# Patient Record
Sex: FEMALE | Employment: FULL TIME | URBAN - METROPOLITAN AREA
[De-identification: names, ages, dates, MRNs, and addresses within clinical notes are randomized per-mention and may not be internally consistent; named-entity substitution may affect disease eponyms.]

---

## 2017-01-20 ENCOUNTER — OFFICE VISIT (OUTPATIENT)
Dept: DERMATOLOGY | Facility: CLINIC | Age: 32
End: 2017-01-20

## 2017-01-20 DIAGNOSIS — D23.9 DERMATOFIBROMA: Primary | ICD-10-CM

## 2017-01-20 DIAGNOSIS — L81.4 SOLAR LENTIGO: ICD-10-CM

## 2017-01-20 DIAGNOSIS — L81.1 MELASMA: ICD-10-CM

## 2017-01-20 DIAGNOSIS — L70.0 ACNE VULGARIS: ICD-10-CM

## 2017-01-20 RX ORDER — CITALOPRAM HYDROBROMIDE 10 MG/1
TABLET ORAL
COMMUNITY
Start: 2016-12-26

## 2017-01-20 ASSESSMENT — PAIN SCALES - GENERAL: PAINLEVEL: NO PAIN (0)

## 2017-01-20 NOTE — PROGRESS NOTES
"[facility] Dermatology Note      Dermatology Problem List:  1. Melasma  -4% hydroquinone and 2.5% hydrocortisone QHS for three months then stop  -Sun protective measures  -Reassess at next visit if she needs to continue treatment  2. Acne  -Tretinoin 0.1% QHS  3. Solar lentigenes  -Sun protection  4. DF  -Reassured pt about benign nature  5. Irritated nevus, right breast  -Pt is considering having this removed; assess at next visit    Encounter Date: Jan 20, 2017    CC:    Chief Complaint    Patient presents with       Derm Problem        3 month follow up for Melasma. \"the upper lip has gotten better but not a huge change on the left cheek. It has helped my acne alot.\" The patient has a lesion on right hip area, she had something like this removed about 10 years ago.         History of Present Illness:  Ms. Velvet Espinoza is a 31 year old female who presents as a follow-up for melsma.     The pt states the spots have been present for about 3-4 years. She has tried several OTC agents, including [place] (2% hydroquinone) which did not help. She did see an outside dermatologist about 3-4 years ago when this started, but that physician did not tell her what this was. Of note, pt was pregnant two years ago, and she is unsure if her melasma worsened during that time, but since then, she has developed ner dark lesions on her face. She does remark that it tends to be more noticeable when she is outside for prolonged periods of time. Pt does admits to a hx of irregular menses, but when she has been on OCP in past, she does not think it has affected her dark spots. Otherwise, the pt denies any other skin lesions at this time.    Past Medical History:    There is no problem list on file for this patient.     Past Medical History     History reviewed. No pertinent past medical history.      Past Surgical History     History reviewed. No pertinent past surgical history.       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    Family History:  There is no family history of skin cancer.    Medications:   Current Outpatient Prescriptions     Current Outpatient Prescriptions    Medication  Sig  Dispense  Refill       buPROPion (WELLBUTRIN SR) 150 MG 12 hr tablet  daily                           No Known Allergies      Review of Systems:  -As per HPI  -Constitutional: The patient denies fatigue, fevers, chills, unintended weight loss, and night sweats.  -HEENT: Patient denies nonhealing oral sores.  -Skin: As above in HPI. No additional skin concerns.    Physical exam:  Vitals: There were no vitals taken for this visit.  GEN: This is a well developed, well-nourished female in no acute distress, in a pleasant mood.     SKIN: Focused examination of the face, scalp, neck, right breast, and lower legs was performed.  -Hyperpigmented macules and patches that are excentuated with the Wood's lamp are noted on the forehead, temples, and malar cheeks  -Hyperpigmented macules on the temples that are not excenuated with the Wood's lamp  -A hyperpimgneted papule with a positive Desir sign on the left lower leg  -A papule with a true pigment network on the right breast  -No other lesions of concern on areas examined.     Impression/Plan:  1. Melasma  -4% hydroquinone and 2.5% hydrocortisone QHS for three months then stop  -Sun protective measures discussed  -At next visit, will discuss if she should continue treatment or stop    2. Acne  -Tretinoin 0.1% QHS    3. Solar lentigenes  -Sun protection discussed    4. DF  -Left lower leg  -Reassured pt about benign nature    5. Irritated nevus, right breast  -Pt is considering having this removed; assess at next visit    Follow-up in 3 months, earlier for new or changing lesions.     Dr. Leach staffed the patient.    Staff Involved:  Resident(Aneesh Hensley)/Staff(as above)

## 2017-01-20 NOTE — NURSING NOTE
"Dermatology Rooming Note    Velvet Espinoza's goals for this visit include:   Chief Complaint   Patient presents with     Derm Problem     3 month follow up for Melasma. \"the upper lip has gotten better but not a huge change on the left cheek. It has helped my acne alot.\" The patient has a lesion on right hip area, she had something like this removed about 10 years ago.     Stephanie Kelly, CMA    "

## 2017-01-20 NOTE — PROGRESS NOTES
"[facility] Dermatology Note    Dermatology Problem List:  1. Melasma  - hydroquinone 4% cream, hydrocortisone 2.5% ointment  2. Acne vulgaris  - tretinoin 0.1% cream, benzoyl peroxide facial wash    Encounter Date: Jan 20, 2017    CC:   Chief Complaint   Patient presents with     Derm Problem     3 month follow up for Melasma. \"the upper lip has gotten better but not a huge change on the left cheek. It has helped my acne alot.\" The patient has a lesion on right hip area, she had something like this removed about 10 years ago.     History of Present Illness:  Ms. Velvet Espinoza is a 31 year old female who presents as a follow up for melasma. The patient was last seen on 10/21/16 when she started hydroquinone 4% cream, hydrocortisone 2.5% ointment and tretinoin 0.1% cream for melasma and acne vulgaris. Today the patient reports that the upper lip has gotten, but not a major change on the left cheek. She states that her acne has improvement. She states that she is using all 3 of the creams mixed together as instructed. She notes that around her eyes were dry, but now it is very manageable. She states that using benzoyl peroxide facial wash is too drying to her skin so she has only using it once a day. She denies currently using an oral contraceptive. She believes that becoming pregnant and sun were the causes of the condition. The patient also reports spots of concern on her cheeks. She also reports a spot on her right hip near her buttocks that came out of nowhere but has not changed in size. She had a similar one on her left hip that was removed and has a ugly scar. The patient reports no other lesions of concern at this time.     Otherwise, the patient reports no painful, bleeding, nonhealing, or pruritic lesions, and denies new or changing moles.    Past Medical History:   There is no problem list on file for this patient.    History reviewed. No pertinent past medical history.  History " reviewed. No pertinent past surgical history.    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    Family History:  There is no family history of skin cancer.    Medications:  Current Outpatient Prescriptions   Medication Sig Dispense Refill     citalopram (CELEXA) 10 MG tablet        tretinoin (RETIN-A) 0.1 % cream Apply topically At Bedtime 45 g 4     hydroquinone 4 % CREA Externally apply topically At Bedtime 56.8 g 3     hydrocortisone 2.5 % ointment Apply topically 2 times daily 30 g 4      No Known Allergies    Review of Systems:  -Skin: As above in HPI. No additional skin concerns.    Physical exam:  Vitals: There were no vitals taken for this visit.  GEN: This is a well developed, well-nourished female in no acute distress, in a pleasant mood.      SKIN: Focused examination of the face, buttocks, shoulders was performed.  - Minimally perceptible geographically bordered, light brown patches on the zygomatic arches respecting the orbit  - Interval resolution of hyperpigmentation on the upper lip  - Scattered brown macules on the face  - On the right buttocks, 4 mm firm and indented light purple to brown papule  - No other lesions of concern on areas examined.     Impression/Plan:  1. Melasma  - Stop hydroquinone 4% cream - apply topically at bedtime. Can restart this in about 3 months.   - Continue hydrocortisone 2.5% ointment - apply topically bid.   - Sun precaution was advised including the use of sun screens of SPF 30 or higher, the importance of reapplying, and wearing hats.     2. Acne vulgaris  - Continue tretinoin 0.1% cream - apply a pea size amount to the face every other night, increase to every night if tolerated. Advised to stop this medication if the patient becomes pregnant.   - Continue benzoyl peroxide facial wash - apply once a day. Advised to stop this medication if the patient becomes pregnant.   - The patient will follow up if she becomes  pregnant for other topical medications.     3. Solar lentigines   - Benign nature was discussed. No further intervention required at this time.   - Discussion of sun precaution.     4. Dermatofibroma - right buttocks  - Benign nature was discussed. No further intervention required at this time.   - Discussion of the potential causes of this lesion.   - If the lesion grows or produces symptoms follow up.     Follow-up in 6 months, earlier for new or changing lesions.     Staff Involved:  Scribe Disclosure:   I, Angela Granados, am serving as a scribe to document services personally performed by Dr. Paco Leach, based on data collection and the provider's statements to me.     Staff attestation:  The documentation recorded by the scribe accurately reflects the services I personally performed and the decisions I personally made.    Paco Leach MD  Staff Dermatologist    Department of Dermatology

## 2017-01-20 NOTE — Clinical Note
"1/20/2017       RE: Velvet Espinoza  [address] [place] [address]     Dear Colleague,    Thank you for referring your patient, Velvet Espinoza, to the [facility] DERMATOLOGY at [facility]. Please see a copy of my visit note below.    [facility] Dermatology Note    Dermatology Problem List:  1. Melasma  - hydroquinone 4% cream, hydrocortisone 2.5% ointment  2. Acne vulgaris  - tretinoin 0.1% cream, benzoyl peroxide facial wash    Encounter Date: Jan 20, 2017    CC:   Chief Complaint   Patient presents with     Derm Problem     3 month follow up for Melasma. \"the upper lip has gotten better but not a huge change on the left cheek. It has helped my acne alot.\" The patient has a lesion on right hip area, she had something like this removed about 10 years ago.     History of Present Illness:  Ms. Velvet Espinoza is a 31 year old female who presents as a follow up for melasma. The patient was last seen on 10/21/16 when she started hydroquinone 4% cream, hydrocortisone 2.5% ointment and tretinoin 0.1% cream for melasma and acne vulgaris. Today the patient reports that the upper lip has gotten, but not a major change on the left cheek. She states that her acne has improvement. She states that she is using all 3 of the creams mixed together as instructed. She notes that around her eyes were dry, but now it is very manageable. She states that using benzoyl peroxide facial wash is too drying to her skin so she has only using it once a day. She denies currently using an oral contraceptive. She believes that becoming pregnant and sun were the causes of the condition. The patient also reports spots of concern on her cheeks. She also reports a spot on her right hip near her buttocks that came out of nowhere but has not changed in size. She had a similar one on her left hip that was removed and has a ugly scar. The patient reports no other lesions of concern at " this time.     Otherwise, the patient reports no painful, bleeding, nonhealing, or pruritic lesions, and denies new or changing moles.    Past Medical History:   There is no problem list on file for this patient.    History reviewed. No pertinent past medical history.  History reviewed. No pertinent past surgical history.    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    Family History:  There is no family history of skin cancer.    Medications:  Current Outpatient Prescriptions   Medication Sig Dispense Refill     citalopram (CELEXA) 10 MG tablet        tretinoin (RETIN-A) 0.1 % cream Apply topically At Bedtime 45 g 4     hydroquinone 4 % CREA Externally apply topically At Bedtime 56.8 g 3     hydrocortisone 2.5 % ointment Apply topically 2 times daily 30 g 4      No Known Allergies    Review of Systems:  -Skin: As above in HPI. No additional skin concerns.    Physical exam:  Vitals: There were no vitals taken for this visit.  GEN: This is a well developed, well-nourished female in no acute distress, in a pleasant mood.      SKIN: Focused examination of the face, buttocks, shoulders was performed.  - Minimally perceptible geographically bordered, light brown patches on the zygomatic arches respecting the orbit  - Interval resolution of hyperpigmentation on the upper lip  - Scattered brown macules on the face  - On the right buttocks, 4 mm firm and indented light purple to brown papule  - No other lesions of concern on areas examined.     Impression/Plan:  1. Melasma  - Stop hydroquinone 4% cream - apply topically at bedtime. Can restart this in about 3 months.   - Continue hydrocortisone 2.5% ointment - apply topically bid.   - Sun precaution was advised including the use of sun screens of SPF 30 or higher, the importance of reapplying, and wearing hats.     2. Acne vulgaris  - Continue tretinoin 0.1% cream - apply a pea size amount to the face every other night,  increase to every night if tolerated. Advised to stop this medication if the patient becomes pregnant.   - Continue benzoyl peroxide facial wash - apply once a day. Advised to stop this medication if the patient becomes pregnant.   - The patient will follow up if she becomes pregnant for other topical medications.     3. Solar lentigines   - Benign nature was discussed. No further intervention required at this time.   - Discussion of sun precaution.     4. Dermatofibroma - right buttocks  - Benign nature was discussed. No further intervention required at this time.   - Discussion of the potential causes of this lesion.   - If the lesion grows or produces symptoms follow up.     Follow-up in 6 months, earlier for new or changing lesions.     Staff Involved:  Scribe Disclosure:   I, Angela Granados, am serving as a scribe to document services personally performed by Dr. Paco Leach, based on data collection and the provider's statements to me.     Staff attestation:  The documentation recorded by the scribe accurately reflects the services I personally performed and the decisions I personally made.    Paco Leach MD  Staff Dermatologist    Department of Dermatology

## 2019-07-30 ENCOUNTER — OFFICE VISIT (OUTPATIENT)
Dept: DERMATOLOGY | Facility: CLINIC | Age: 34
End: 2019-07-30
Payer: COMMERCIAL

## 2019-07-30 DIAGNOSIS — D22.9 MULTIPLE BENIGN MELANOCYTIC NEVI: ICD-10-CM

## 2019-07-30 DIAGNOSIS — L81.1 MELASMA: ICD-10-CM

## 2019-07-30 DIAGNOSIS — L81.4 SOLAR LENTIGO: Primary | ICD-10-CM

## 2019-07-30 DIAGNOSIS — L70.0 ACNE VULGARIS: ICD-10-CM

## 2019-07-30 RX ORDER — TRETINOIN 1 MG/G
CREAM TOPICAL AT BEDTIME
Qty: 45 G | Refills: 4 | Status: CANCELLED | OUTPATIENT
Start: 2019-07-30

## 2019-07-30 RX ORDER — HYDROQUINONE 40 MG/G
CREAM TOPICAL AT BEDTIME
Qty: 56.8 G | Refills: 3 | Status: CANCELLED | OUTPATIENT
Start: 2019-07-30

## 2019-07-30 RX ORDER — HYDROCORTISONE 25 MG/G
OINTMENT TOPICAL 2 TIMES DAILY
Qty: 30 G | Refills: 4 | Status: CANCELLED | OUTPATIENT
Start: 2019-07-30

## 2019-07-30 ASSESSMENT — PAIN SCALES - GENERAL: PAINLEVEL: NO PAIN (1)

## 2019-07-30 NOTE — NURSING NOTE
Chief Complaint   Patient presents with     Derm Problem     Velvet is here today to be seen melasma, a nonhealing spot on nose and a couple moles.      Mayela Fierro LPN

## 2019-07-30 NOTE — PROGRESS NOTES
[facility] Dermatology Note      Dermatology Problem List:  1. Melasma  - start Fluocinolone-Hydroquinone-Tretinoin 0.01-4-0.05% cream at bedtime  - discussed trial of iron oxide containing sunscreen for visible light protection  - prior treatment: hydroquinone 4% cream, hydrocortisone 2.5% ointment, tretinoin 0.1% cream  2. Acne vulgaris: well controlled w/ single inflammatory papule  - transition to Fluocinolone-Hydroquinone-Tretinoin 0.01-4-0.05% cream at bedtime  - prior: tretinoin 0.1% cream, benzoyl peroxide facial wash  3. Solar lentigines  4. Atrophic biopsy scar: pt considering surgical revision and will discuss at follow-up  5. Dermal nevi x 2 right chest wall: posterior lesion irritated on exam 7/30/19   - pt considering shave at follow-up         Encounter Date: Jul 30, 2019    CC:   Chief Complaint   Patient presents with     Derm Problem     Velvet is here today to be seen melasma, a nonhealing spot on nose and a couple moles.          History of Present Illness:  Ms. Velvet Espinoza is a 34 year old female who presents as a follow-up for melasma. The patient was last seen 1/20/2017 when she was continued on hydroqinone 4% cream and hydrocortisone 2.5% cream.    She notes that her symptoms and hyperpigmentation was improved slightly on the above regiment but did not note a drastic difference; maybe upper lip got slightly better. She became pregnant 09/2017 and deliver 06/2018 and has been breast feeding since. As a result, she discontinued the above medications. Notes that her skin got significantly worse with the pregnancy but the overall degree of pigmentation has improved since delivering but feels like overall involvement is spreading. Endorses daily use of spf 30+ sunscreen daily and will use 50+ sunscreen when expecting exposure. She notes she is spotty with reapplication and use of sun-protective clothing. She additionally has a pimple on her nose for the 2 mo which she  has tried squeezing to express and is intermittently been picking it.  She thinks maybe the vitamin E oil has caused it to become more pimple-like again.  She is curious if there is anything that we can do in clinic today.  Additionally brought vitamin E oil and natural scar cream otc and is wondering if this is helpful or if there are other intervention she could try for a post biopsy scar on her abdomen. Two moles that she would like examined today on the right lateral chest wall.  One gets caught on her bra and the other is slightly painful today.  Not changing and otherwise asymptomatic.    Past Medical History: reviewed and updated below  Acne vulgaris  Melasma  Anxiety  Depression    Social History:  Patient reports that she quit smoking about 5 years ago. She has never used smokeless tobacco.    Family History:  Family History   Problem Relation Age of Onset     Melanoma No family hx of      Skin Cancer No family hx of        Medications:  Current Outpatient Medications   Medication Sig Dispense Refill     citalopram (CELEXA) 10 MG tablet        fluocin-hydroquinone-tretinoin 0.01-4-0.05 % CREA Externally apply topically At Bedtime 30 g 11     hydrocortisone 2.5 % ointment Apply topically 2 times daily (Patient not taking: Reported on 7/30/2019) 30 g 4     hydroquinone 4 % CREA Externally apply topically At Bedtime (Patient not taking: Reported on 7/30/2019) 56.8 g 3     tretinoin (RETIN-A) 0.1 % cream Apply topically At Bedtime (Patient not taking: Reported on 7/30/2019) 45 g 4        No Known Allergies      Review of Systems:  -Skin Establ Pt: The patient denies any new rash, pruritus, or lesions that are symptomatic, changing or bleeding, except as per HPI.  -Constitutional: Otherwise feeling well today, in usual state of health.  -HEENT: Patient denies nonhealing oral sores.  -Skin: As above in HPI. No additional skin concerns.    Physical exam:  GEN: This is a well developed, well-nourished female in no  acute distress, in a pleasant mood.    Psych: mildly anxious appearing female  HEENT: NC AT. EOMI. Oropharynx clear without concerning lesion  SKIN: Focused examination of the scalp, face, neck, chest and bilateral upper extremities, digits and nails was performed.  -Single inflammatory erythematous papule on the right nasal sidewall inferior to glabella  -Hyperpigmented macules coalescing into patches on the bilateral cheeks, forehead and chin with sparing of two patches on the central forehead  -Hyperpigmented macules scattered on sun exposed skin - notable lesions on bilateral temples and right brow  -Mildly exophytic hyperpigmented papule on lateral right breast - cobblestoned surface w/o concerning features on dermoscopy  -Pigment-tipped papule with erythematous base on right chest wall - no concerning features on dermoscopy  -Single minimally atrophic light pink well-healed scar on left abdominal wall  -No other lesions of concern on areas examined.     Impression/Plan:  1. Melasma: Discussed options including combination triple therapy in isolation as previously prescribed (given prior tolerance and c/f cost), but patient would prefer single agent. She additionally would like to try a different regiment as she did not note significant improvement previously.     Will transition Fluocinolone-Hydroquinone-Tretinoin 0.01-4-0.05% cream at bedtime     Photodocumentation taken today for comparison    Sun precaution was advised including the use of sun screens of SPF 30 or higher, sun protective clothing, and avoidance of tanning beds.    Could consider trial of iron oxide containing sunscreen for visible light protection     Follow-up in 3 mo to assess response    2. Solar lentigines    ABCDs of melanoma were discussed and self skin checks were advised.     Sun precaution was advised including the use of sun screens of SPF 30 or higher, sun protective clothing, and avoidance of tanning beds.    Reassurance provided  and benign nature of condition discussed    3. Acne vulgaris: single inflamed lesion today - discussed preference for conservative management given inflammation on exam today.    Will assess response to Fluocinolone-Hydroquinone-Tretinoin 0.01-4-0.05% cream at bedtime    4. Well-healed atrophic biopsy scar: no evidence of pigmentation or nodularity at previous sight on left abdomen. Discussed likely final cosmetic appearance and potential approaches to management    Discussed treatment options including surgical revision in future    Patient would like to consider    5.   Dermal nevi x 2: posterior papule appears mildly irritated today. Discussed management options including shave; however, patient would like to consider    Deferring intervention at present per patient preference    Reassurance provided and benign nature of condition discussed    Follow-up in 3 months, earlier for new or changing lesions.      staffed the patient.    Staff Involved:  Resident(Fish Boudreaux)/Staff    Staff Physician Comments:   I saw and evaluated the patient with the resident and I agree with the assessment and plan.  I was present for the examination. I have made edits if needed.    Paco Leach MD  Staff Dermatologist and Dermatopathologist  , Department of Dermatology

## 2019-07-30 NOTE — PATIENT INSTRUCTIONS
We will transition to tri-yris for ease and to assess response; use for six months on and a month off (to assess response and assure there is no rebound worsening. Could consider use of iron oxide containing sunscreen for visible light protection; otherwise continue with diligent use of sunscreen (pediatric handout given but same as adult recs. We also took photos for comparison at follow-up in 3 months.    Pediatric Dermatology  [place] [id number], [address] [address]  [phone]    SUN PROTECTION    WHY PROTECT AGAINST THE SUN?  In the past, sun exposure was thought to be a healthy benefit of outdoor activity. However, studies have shown many unhealthy effects of sun exposure, such as early aging of the skin and skin cancer.    WHAT KIND OF DAMAGE DOES THE SUN EXPOSURE CAUSE?  Part of the sun s energy that reaches earth is composed of rays of invisible ultraviolet (UV) light. When ultraviolet light rays (UVA and UVB) enter the skin, they damage skin cells, causing visible and invisible injuries.    Sunburn is a visible type of damage, which appears just a few hours after sun exposure. In many people this type of damage also causes tanning. Freckles, which occur in people with fair skin, are usually due to sun exposure. Freckles are nearly always a sign that sun damage has occurred, and therefore show the need for sun protection.    Ultraviolet light rays also cause invisible damage to skin cells. Some of the injury is repaired but some of the cell damage adds up year after year. After 20-30 years or more, the built-up damage appears as wrinkles, age spots and even skin cancer.  Although window glass blocks UVB light, UVA rays are able to penetrate through the glass.    HOW CAN I PROTECT MY CHILD FROM EXCESSIVE SUN EXPOSURE?  1. Avoidance. Plan your activities to avoid being in the sun in the middle of the day. Sun exposure is more intense closer to the equator, in the  mountains and in the summer. The sun s damaging effects are increased by reflection from water, white sand and snow. Avoid long periods of direct sun exposure. Sit or play in the shade, especially when your shadow is shorter then you are tall. Stay out of the sun during peak hours of 10 am - 2 pm.   2. Use protective clothing.  Cover up with light colored clothing when outdoors including a hat to protect the scalp and face. In addition to filtering out the sun, tightly woven clothing reflects heat and helps keep you feeling cool. Sunglasses that block ultraviolet rays protect the eyes and eyelids. Multiple retailers now sell clothing and swimwear for adults and children that is made of special fabric that protects against the sun.    3. Apply a broad-spectrum UVA and UVB sunscreen with an SPF of 30 of higher and reapply approximately every two hours, even on cloudy days. If swimming or participating in intense physical activity, sunscreen may need to be applied more often.   4. Infants should be kept out of direct sun and be covered by protective clothing when possible. If sun exposure is unavoidable, sunscreen should be applied to exposed areas (i.e. face, hands).    IS SUNSCREEN SAFE?  Hats, clothing and shade are the most reliable forms of sun protection, but sunscreen is also an important part of protecting your child from the sun. Some have raised concerns about chemical sunscreens and the dangers of absorption. Most of this concern is theoretical, and our providers would be happy to discuss this with you.  Most dermatologists agree that the risk of unprotected sun exposure far outweighs the theoretical risks of sunscreens.      WHAT IF I HAVE AN INFANT OR YOUNG CHILD WITH SENSITIVE SKIN?  The following sunscreens may be better for your child s sensitive skin. The main active ingredients are inert, either titanium dioxide or zinc oxide. These ingredients are less irritating than chemical sunscreens.   Be wary  of the word  baby  or  organic : these words don t always mean that the product is hypoallergenic.  Please also note that this list is not all-inclusive, and that we do not formally endorse any of these products.     Aveeno Active Natural Protection Mineral Block Lotion SPF 30  Aveeno Baby Natural Protection Face Stick SPF 50+  Banana Boat Natural Reflect (baby or kids) SPF 50+  Bare Republic SPR 50 Stick   Beauty Countersun Mineral Sunscreen Stick SPF 30  Nik s Bees Chemical-Free Sunscreen SPF 30  Blue Lizard Baby SPF 30+  Blue Lizard for Sensitive Skin SPF 30+  Cotz Pediatric Pure SPF 30  Cotz Pediatric Face SPF 40  Cotz 20% Zinc SPF 35  CVS Sensitive Skin 30  CVS Baby Lotion Sunscreen SPF 60+  EltaMD UV Physical Broad-Spectrum SPF 41  La Roche-Posay Anthelios Mineral Zinc Oxide Sunscreen SPF 50  Mustella Broad Spectrum SPF 50+/Mineral Sunscreen Stick  Neutrogena Sensitive Skin- Pure and Free Baby SPF 30  Neutrogena Sensitive Skin-Pure and Free Baby  SPF 50+  Neutrogena Sheer Zinc Oxide Dry-Touch Face Sunscreen with Broad Spectrum SPF 50, Oil-Free, Non-Comedogenic & Non-Greasy Mineral Sunscreen  Thinkbaby Safe Sunscreen SPF 50+,   Thinksport Sunscreen SPF 50+,   PreSun Sensitive Sunblock SPF 28  Vanicream Sunscreen for Sensitive Skin SPF 30 or 50  [employer]green s Sensitive Skin SPF 70    WHERE CAN I BUY SUN PROTECTIVE CLOTHING AND SWIMWEAR?   Many retailers sell these products.  Coolibar, Solumbra, Sunday Afternoons, and Athleta are some examples.  Many other popular children s brands have started selling UV protective swimwear, and we recommend swimsuits that include swim shirts and don t leave extra skin exposed.   UV protective products can also be washed into clothing (eg: Rit Sun Guard Laundry UV Protectant).     SHOULD I WORRY ABOUT MY CHILD NOT GETTING ENOUGH VITAMIN D?  Vitamin D is essential for many processes in the body, and it is important for bone growth in children.  But while the sun is one source of  vitamin D, it is also the source of harmful ultraviolet radiation resulting in thousands of skin cancers each year. The official recommendation of the American Academy of Dermatology (AAD) is that vitamin D should be obtained through dietary sources and supplementation rather than from sunlight.     For more information on sun safety and more FAQs about sun protection, visit:  http://www.aad.org/media-resources/stats-and-facts/prevention-and-care/sunscreens

## 2019-07-30 NOTE — LETTER
7/30/2019       RE: Velvet Espinoza  [address]  [facility] [address]     Dear Colleague,    Thank you for referring your patient, Velvet Espinoza, to the [facility] DERMATOLOGY at [facility]. Please see a copy of my visit note below.    [facility] Dermatology Note      Dermatology Problem List:  1. Melasma  - start Fluocinolone-Hydroquinone-Tretinoin 0.01-4-0.05% cream at bedtime  - discussed trial of iron oxide containing sunscreen for visible light protection  - prior treatment: hydroquinone 4% cream, hydrocortisone 2.5% ointment, tretinoin 0.1% cream  2. Acne vulgaris: well controlled w/ single inflammatory papule  - transition to Fluocinolone-Hydroquinone-Tretinoin 0.01-4-0.05% cream at bedtime  - prior: tretinoin 0.1% cream, benzoyl peroxide facial wash  3. Solar lentigines  4. Atrophic biopsy scar: pt considering surgical revision and will discuss at follow-up  5. Dermal nevi x 2 right chest wall: posterior lesion irritated on exam 7/30/19   - pt considering shave at follow-up         Encounter Date: Jul 30, 2019    CC:   Chief Complaint   Patient presents with     Derm Problem     Velvet is here today to be seen melasma, a nonhealing spot on nose and a couple moles.          History of Present Illness:  Ms. Velvet Espinoza is a 34 year old female who presents as a follow-up for melasma. The patient was last seen 1/20/2017 when she was continued on hydroqinone 4% cream and hydrocortisone 2.5% cream.    She notes that her symptoms and hyperpigmentation was improved slightly on the above regiment but did not note a drastic difference; maybe upper lip got slightly better. She became pregnant 09/2017 and deliver 06/2018 and has been breast feeding since. As a result, she discontinued the above medications. Notes that her skin got significantly worse with the pregnancy but the overall degree of pigmentation has improved since delivering but feels  like overall involvement is spreading. Endorses daily use of spf 30+ sunscreen daily and will use 50+ sunscreen when expecting exposure. She notes she is spotty with reapplication and use of sun-protective clothing. She additionally has a pimple on her nose for the 2 mo which she has tried squeezing to express and is intermittently been picking it.  She thinks maybe the vitamin E oil has caused it to become more pimple-like again.  She is curious if there is anything that we can do in clinic today.  Additionally brought vitamin E oil and natural scar cream otc and is wondering if this is helpful or if there are other intervention she could try for a post biopsy scar on her abdomen. Two moles that she would like examined today on the right lateral chest wall.  One gets caught on her bra and the other is slightly painful today.  Not changing and otherwise asymptomatic.    Past Medical History: reviewed and updated below  Acne vulgaris  Melasma  Anxiety  Depression    Social History:  Patient reports that she quit smoking about 5 years ago. She has never used smokeless tobacco.    Family History:  Family History   Problem Relation Age of Onset     Melanoma No family hx of      Skin Cancer No family hx of        Medications:  Current Outpatient Medications   Medication Sig Dispense Refill     citalopram (CELEXA) 10 MG tablet        fluocin-hydroquinone-tretinoin 0.01-4-0.05 % CREA Externally apply topically At Bedtime 30 g 11     hydrocortisone 2.5 % ointment Apply topically 2 times daily (Patient not taking: Reported on 7/30/2019) 30 g 4     hydroquinone 4 % CREA Externally apply topically At Bedtime (Patient not taking: Reported on 7/30/2019) 56.8 g 3     tretinoin (RETIN-A) 0.1 % cream Apply topically At Bedtime (Patient not taking: Reported on 7/30/2019) 45 g 4        No Known Allergies      Review of Systems:  -Skin Establ Pt: The patient denies any new rash, pruritus, or lesions that are symptomatic, changing or  bleeding, except as per HPI.  -Constitutional: Otherwise feeling well today, in usual state of health.  -HEENT: Patient denies nonhealing oral sores.  -Skin: As above in HPI. No additional skin concerns.    Physical exam:  GEN: This is a well developed, well-nourished female in no acute distress, in a pleasant mood.    Psych: mildly anxious appearing female  HEENT: NC AT. EOMI. Oropharynx clear without concerning lesion  SKIN: Focused examination of the scalp, face, neck, chest and bilateral upper extremities, digits and nails was performed.  -Single inflammatory erythematous papule on the right nasal sidewall inferior to glabella  -Hyperpigmented macules coalescing into patches on the bilateral cheeks, forehead and chin with sparing of two patches on the central forehead  -Hyperpigmented macules scattered on sun exposed skin - notable lesions on bilateral temples and right brow  -Mildly exophytic hyperpigmented papule on lateral right breast - cobblestoned surface w/o concerning features on dermoscopy  -Pigment-tipped papule with erythematous base on right chest wall - no concerning features on dermoscopy  -Single minimally atrophic light pink well-healed scar on left abdominal wall  -No other lesions of concern on areas examined.     Impression/Plan:  1. Melasma: Discussed options including combination triple therapy in isolation as previously prescribed (given prior tolerance and c/f cost), but patient would prefer single agent. She additionally would like to try a different regiment as she did not note significant improvement previously.     Will transition Fluocinolone-Hydroquinone-Tretinoin 0.01-4-0.05% cream at bedtime     Photodocumentation taken today for comparison    Sun precaution was advised including the use of sun screens of SPF 30 or higher, sun protective clothing, and avoidance of tanning beds.    Could consider trial of iron oxide containing sunscreen for visible light protection     Follow-up in  3 mo to assess response    2. Solar lentigines    ABCDs of melanoma were discussed and self skin checks were advised.     Sun precaution was advised including the use of sun screens of SPF 30 or higher, sun protective clothing, and avoidance of tanning beds.    Reassurance provided and benign nature of condition discussed    3. Acne vulgaris: single inflamed lesion today - discussed preference for conservative management given inflammation on exam today.    Will assess response to Fluocinolone-Hydroquinone-Tretinoin 0.01-4-0.05% cream at bedtime    4. Well-healed atrophic biopsy scar: no evidence of pigmentation or nodularity at previous sight on left abdomen. Discussed likely final cosmetic appearance and potential approaches to management    Discussed treatment options including surgical revision in future    Patient would like to consider    5.   Dermal nevi x 2: posterior papule appears mildly irritated today. Discussed management options including shave; however, patient would like to consider    Deferring intervention at present per patient preference    Reassurance provided and benign nature of condition discussed    Follow-up in 3 months, earlier for new or changing lesions.      staffed the patient.    Staff Involved:  Resident(Fish Boudreaux)/Staff    Staff Physician Comments:   I saw and evaluated the patient with the resident and I agree with the assessment and plan.  I was present for the examination. I have made edits if needed.    Paco Leach MD  Staff Dermatologist and Dermatopathologist  , Department of Dermatology

## 2021-04-24 ENCOUNTER — HEALTH MAINTENANCE LETTER (OUTPATIENT)
Age: 36
End: 2021-04-24

## 2021-09-08 ENCOUNTER — TRANSFERRED RECORDS (OUTPATIENT)
Dept: HEALTH INFORMATION MANAGEMENT | Facility: CLINIC | Age: 36
End: 2021-09-08

## 2021-10-09 ENCOUNTER — HEALTH MAINTENANCE LETTER (OUTPATIENT)
Age: 36
End: 2021-10-09

## 2022-05-16 ENCOUNTER — HEALTH MAINTENANCE LETTER (OUTPATIENT)
Age: 37
End: 2022-05-16

## 2022-09-11 ENCOUNTER — HEALTH MAINTENANCE LETTER (OUTPATIENT)
Age: 37
End: 2022-09-11

## 2023-06-03 ENCOUNTER — HEALTH MAINTENANCE LETTER (OUTPATIENT)
Age: 38
End: 2023-06-03

## 2023-10-06 ENCOUNTER — TRANSCRIBE ORDERS (OUTPATIENT)
Dept: OTHER | Age: 38
End: 2023-10-06

## 2023-10-06 DIAGNOSIS — R79.89 LOW SERUM CARNITINE BEYOND NEONATAL PERIOD: Primary | ICD-10-CM

## 2024-01-28 NOTE — PROGRESS NOTES
"Video-Visit Details    Type of service:  Video Visit    Video Start Time: 3:00 PM  Video End Time (time video stopped): 3:45 PM    Originating Location (pt. Location): Home    Distant Location (provider location):  [facility]    Mode of Communication:  Video Conference via [facility]                   OUTPATIENT METABOLIC INITIAL VISIT          Date: 2024      Patient:  Velvet Espinoza   :   1985   MRN:     [id number]      Velvet Espinoza  [address] [place] [id number]    Dear Dr. Conde and Velvet Espinoza,    [facility]\" for consultation and treatment of:    1. 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 Both Velvet and her daughter were seen by Dr. Torie Damon at [facility]. Velvet's initial free carnitine in  was 16 which went up to 34 on carnitine supplementation of 1 gram three times a day. However, Velvet did not find any change since she was asymptomatic to begin with and chose to stop supplementation after a month.    Recently, she had an episode of abdominal pain of unknown etiology during the evaluation of which, the carnitine deficiency was brought up again. On recheck of her carnitine level in 2023, her free carnitine was found to be at 10 that led to this current referral to metabolism.    Velvet reports chronic fatigue. She also reports occasional episodes of shaking and sweating suggestive of hypoglycemia that resolves on food intake. She never had her blood glucose level during these episodes. No history of any " exercise intolerance  or chest pain.     Medications:  Current Outpatient Medications   Medication Sig     citalopram (CELEXA) 10 MG tablet      fluocin-hydroquinone-tretinoin 0.01-4-0.05 % CREA Externally apply topically At Bedtime     hydrocortisone 2.5 % ointment Apply topically 2 times daily (Patient not taking: Reported on 7/30/2019)     hydroquinone 4 % CREA Externally apply topically At Bedtime (Patient not taking: Reported on 7/30/2019)     tretinoin (RETIN-A) 0.1 % cream Apply topically At Bedtime (Patient not taking: Reported on 7/30/2019)     No current facility-administered medications for this visit.       Allergies:  No Known Allergies    Physical Examination:  There were no vitals taken for this visit.  Weight %tile:Facility age limit for growth %deloris is 20 years.  Height %tile: Facility age limit for growth %deloris is 20 years.  Head Circumference %tile: Facility age limit for growth %deloris is 20 years.  BMI %tile: No height and weight on file for this encounter.    FAMILY HISTORY: A brief family medical history was reviewed.  REVIEW OF SYSTEMS: The review of systems negative for new eye, ear, heart, lung, liver, spleen, gastrointestinal, bone, muscle, integumentary, endocrinologic, brain or psychiatric issues except as noted above.  PHYSICAL EXAMINATION:   General: The patient is oriented to person, place and time at an age-appropriate manner.   HEENT: The facial features are normal and symmetric. The ears are of normal position and configuration and hearing is grossly normal.  The tongue protrudes normally without fasciculations.  Neck: The neck  appears to be supple with full range of motion  Chest: The chest is of normal configuration. There is no tachypnea or other visible abnormality.  Heart: The patient appears to be well perfused, and in no apparent distress.  Abdomen: Not examined.  Extremities: The extremities are of normal configuration.  Back: Not examined,  Integument: The  visible portion  of the integument is  of normal appearance without significant changes in pigmentation, birthmarks, or lesions.  Neuromuscular:  Mental Status Exam: Alert, awake. Fully oriented. No dysarthria; speech of normal fluency.  Cranial Nerves: EOMs intact, no nystagmus, facial movements symmetric.   Motor: There appears to be normal movement in all four extremities, no atrophy or fasciculations. No tremors.  Gait: By visual examination, there appears to be normal gait; normal arm swing and stance.    LABORATORY RESULTS: Laboratory studies from the past year were reviewed.    ASSESSMENT:  1. 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 Genotype not available for review today  3. Chronic fatigue    PLAN/RECOMMENDATIONS:  1.  Recommend supplementation with Carnitor at 1 gram twice a day. Since Velvet prefers liquid over tablets, prescription will be sent to the preferred pharmacy. Low serum carnitine at 10 or lower, predisposes her to hypoglycemia and needs to be corrected with proper supplementation.  2. Obtain DORETHA to get the genotype from [facility].  3. We will obtain free carnitine and urine carnitine in 1 month. Individuals with carrier status for carnitine deficiency do not need life long supplementation, but may require occasional supplementation periods to avoid very low levels as seen in Velvet.  4. A urine organic acid will also be obtained to evaluate any additional etiologies for her carnitine deficiency.  5. If free carnitine normalizes with one month Carnitor intake, supplementation could be stopped as long as other secondary etiologies have been excluded. However, it is recommended to re-check the carnitine level every 1-2 years for Velvet.  6. We will see Velvet back in the clinic in 1 year or sooner pending test results.    With warmest regards,       Monty Reis MD     Division of Genetics and Metabolism    Appointments: [phone]      Monday  afternoons: Metabolic/Lysosomal storage clinic              [facility]: [phone]/ [phone]               [facility] laboratory: [phone]    Nurse Coordinator, Metabolism and Genetics:  Debbie Humphrey RN, [phone]    Pharmacotherapy Consultant:  Maribel Christianson, PharmD, Pharmacotherapy for Metabolic Disorders ([facility]): [phone]    Genetic Counselor:  Leti Reynoso MS, [facility] [place] (Genetic test Results): [phone]    Metabolic Dietician:  Ofelia Mcginnis, Registered Dietician: [phone]    Advanced Therapies Clinic Scheduler:  Ailyn Khoury, [phone]    Copies to:      Physician No Ref-Primary  No address on file    Velvet Espinoza  [address] [place] [id number]    Dr. Mari Conde MD  [address] [address]      70 minutes spent by me on the date of the encounter doing chart review, history and exam, documentation and further activities per the note

## 2024-01-29 ENCOUNTER — VIRTUAL VISIT (OUTPATIENT)
Dept: CONSULT | Facility: CLINIC | Age: 39
End: 2024-01-29
Attending: PEDIATRICS
Payer: COMMERCIAL

## 2024-01-29 ENCOUNTER — VIRTUAL VISIT (OUTPATIENT)
Dept: PEDIATRICS | Facility: CLINIC | Age: 39
End: 2024-01-29
Attending: PEDIATRICS
Payer: COMMERCIAL

## 2024-01-29 DIAGNOSIS — Z71.83 ENCOUNTER FOR NONPROCREATIVE GENETIC COUNSELING: Primary | ICD-10-CM

## 2024-01-29 DIAGNOSIS — R79.89 LOW SERUM CARNITINE BEYOND NEONATAL PERIOD: ICD-10-CM

## 2024-01-29 DIAGNOSIS — Z15.89: ICD-10-CM

## 2024-01-29 PROCEDURE — 99205 OFFICE O/P NEW HI 60 MIN: CPT | Mod: 95 | Performed by: PEDIATRICS

## 2024-01-29 PROCEDURE — 96040 HC GENETIC COUNSELING, EACH 30 MINUTES: CPT | Mod: GT,95 | Performed by: GENETIC COUNSELOR, MS

## 2024-01-29 RX ORDER — LEVOCARNITINE 1 G/10ML
1000 SOLUTION ORAL 2 TIMES DAILY
Qty: 500 ML | Refills: 3 | Status: SHIPPED | OUTPATIENT
Start: 2024-01-29 | End: 2024-03-05

## 2024-01-29 NOTE — PROGRESS NOTES
Video-Visit Details    Type of service:  Video Visit    Video Start Time: 2:51 pm    Video End Time: 3:12 pm    Originating Location (pt. Location): Home    Distant Location (provider location):  On-site    Mode of Communication:  Video Conference via [facility]    Name:  Velvet Espinoza  :   1985  MRN:   [id number]  Date of service: 2024  Primary Provider: No Ref-Primary, Physician  Referring Provider: Mari Conde    Presenting Information:  Velvet, a 38 year old female, was seen was seen for a video visit at the [facility] for evaluation of low carnitine. We met with Velvet at the request of Dr. Reis to obtain a family history and to discuss the genetics and inheritance of primary carnitine deficiency.        Family History:   A three generation pedigree was obtained today. A copy is located in the media tab and full details are available in the pedigree section of the history tab. 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 We are working on obtaining her genetic testing report from [facility]. Addendum 24: A copy of Velvet's report was received and will be scanned into her chart. 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 Refer to Dr. Reis's note for a more detailed personal history.   Children:   Son (9y) has stomach issues, but is otherwise healthy. He had a normal [place]  screen.  Son (5y) is healthy. He had a normal [place]  screen.  Daughter (2y) had an abnormal  screen for low carnitine. She was seen by [facility] and is also reportedly a carrier for primary carnitine deficiency.   Siblings:  Brother (43y) is healthy. His two children are healthy.   Maternal Relatives:  Mother  (72y) has fibromyalgia and IBS.  Aunt with breast cancer diagnosed at age 50.  Cousin with rheumatoid arthritis.  Cousin with schizophrenia.   Other aunts, uncle, and cousins are healthy.   Grandmother passed away at age 91 from congestive heart disease.  Grandfather passed away at age 96. He had dementia and Parkinson's disease.   Paternal Relatives:  Father (77y) has an idiopathic tremor.  Aunt passed away in her 60s. She had a brain tumor diagnosed at age 19 and was impaired from surgery.   Aunt with an apnea episode in her 40s which caused brain damage.   Other aunt and cousins are healthy.  Grandmother passed away at age 95 from breast cancer diagnosed in her 80s.  Grandfather passed away from colon cancer in his 80s.     The family history is otherwise negative for abnormal  screens, hearing loss, vision loss, intellectual disability, developmental delay, short stature, muscle weakness, infertility, multiple miscarriages, stillbirth, birth defects, sudden death, and known genetic disorders. Consanguinity is denied.      Discussion and Assessment:  Genes are long stretches of DNA that are responsible for how our bodies look and how our bodies work.  Our genes are inherited on structures called chromosomes of which we have 23 pairs.  One copy of each chromosome in a pair is inherited from the mother and one is inherited from the father.  Changes in the DNA sequence of a gene, called variants, as well as changes within the chromosomes can cause the signs and symptoms of a genetic condition.     Primary carnitine deficiency is typically caused by disease-causing variants in a gene called SFU90K3. The DHQ28V3 gene is normally important for creating a protein that brings carnitine into cells where it is necessary for our cells to create energy for the body. Variants in the VZG96W1 gene disrupt this process, causing the signs and symptoms of primary carnitine deficiency. These symptoms can vary greatly between  affected individuals, but can include muscle weakness, low blood sugar, vomiting, cardiomyopathy, and in severe untreated cases, sudden cardiac death. Some affected individuals are asymptomatic.    Primary carnitine deficiency is considered an autosomal recessive condition. This means that to be affected an individual needs a disease-causing variant in both copies of the JRX69G3 gene (one inherited from each parent). Individuals with just one variant are said to be a carrier for this condition. However, some carriers also have milder carnitine deficiency and may experience mild symptoms, especially during times of illness or other stressors. These people are called manifesting carriers. If two individuals are both carriers for primary carnitine deficiency, each child between the couple has a 25% chance of being affected with primary carnitine deficiency, a 50% chance of being a carrier, and a 25% chance of being unaffected and not a carrier.     Per Velvet, she is a carrier for primary carnitine deficiency, meaning she was found to have one disease-causing variant in FSZ82T0. Because she has been found to have low levels of carnitine and has had episodes of hypoglycemia, she is likely a manifesting carrier. Manifesting carriers will sometimes have low levels of carnitine and other times, it will be in the normal range. This is likely why Velvet's sons had normal  screens while her daughter's was abnormal. Refer to Dr. Reis's note for more detail about management.     Velvet's daughter is also reportedly a carrier for primary carnitine deficiency. If she develops any symptoms, she should be seen by the genetics and metabolism team here or at [facility] to consider starting her on carnitine supplementation. She could only have a child with primary carnitine deficiency if her future reproductive partner is also a carrier. When she is an adult and considering starting a family of her own, this should be reviewed  with her and carrier testing can be offered to her partner to better understand the risk to potential children.     Velvet's sons each have a 50% chance of also having inherited the SMW21Y2 variant. Typically, we would recommend waiting until they are adults and considering starting their own families to determine their carrier status because it is most important for reproductive knowledge. However, if they develop any concerning symptoms, we could do testing for them in childhood. The only way any of Velvet's son could be affected with primary carnitine deficiency is if her partner is also a carrier. If the family was concerned about this, Velvet's partner could obtain his own testing.     Other family members also have a chance of being carriers for primary carnitine deficiency and adult family member can consider their own genetic testing. Once we have a copy of [facility] genetic testing results, I am happy to coordinate this testing for interested family members. They can also find a local genetic counselor at [email or web address].     Velvet did not have additional questions today, but she is encouraged to reach out if questions come up.       Plan:  We will [facility] to [facility]     Follow-up as recommended by Dr. Reis.     Additional genetic counseling for Velvet and her family as needed in the future.        Leti Reynoso [facility]  Genetic Counselor  SULMA Avila   Phone: [phone]    This visit was co-counseled by Genetic Counseling Student, Misty Payne.     Approximate Time Spent in Consultation: 21 min

## 2024-01-29 NOTE — LETTER
"2024      RE: Velvet Espinoza  [address] [facility] [address]     Dear Colleague,    Thank you for the opportunity to participate in the care of your patient, Velvet Espinoza, at the [facility] at [facility]. Please see a copy of my visit note below.                     OUTPATIENT METABOLIC INITIAL VISIT          Date: 2024      Patient:  Velvet Espinoza   :   1985   MRN:     [id number]      Velvet Espinoza  [address] [facility] [address]    Dear Dr. Conde and Velvet Espinoza,    Thank you for sending Velvet Espinoza to the [facility] Monday \"Metabolic clinic\" for consultation and treatment of:    1. 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 Both Velvet and her daughter were seen by Dr. Torie Damon at [facility]. Velvet's initial free carnitine in  was 16 which went up to 34 on carnitine supplementation of 1 gram three times a day. However, Velvet did not find any change since she was asymptomatic to begin with and chose to stop supplementation after a month.    Recently, she had an episode of abdominal pain of unknown etiology during the evaluation of which, the carnitine deficiency was brought up again. On recheck of her carnitine level in 2023, her free carnitine was found to be at 10 that led to this current referral to metabolism.    Velvet reports chronic fatigue. She also reports occasional episodes of shaking and sweating suggestive of hypoglycemia that resolves on food intake. She never had her blood glucose level during these " episodes. No history of any exercise intolerance  or chest pain.     Medications:  Current Outpatient Medications   Medication Sig    citalopram (CELEXA) 10 MG tablet     fluocin-hydroquinone-tretinoin 0.01-4-0.05 % CREA Externally apply topically At Bedtime    hydrocortisone 2.5 % ointment Apply topically 2 times daily (Patient not taking: Reported on 7/30/2019)    hydroquinone 4 % CREA Externally apply topically At Bedtime (Patient not taking: Reported on 7/30/2019)    tretinoin (RETIN-A) 0.1 % cream Apply topically At Bedtime (Patient not taking: Reported on 7/30/2019)     No current facility-administered medications for this visit.       Allergies:  No Known Allergies    Physical Examination:  There were no vitals taken for this visit.  Weight %tile:Facility age limit for growth %deloris is 20 years.  Height %tile: Facility age limit for growth %deloris is 20 years.  Head Circumference %tile: Facility age limit for growth %deloris is 20 years.  BMI %tile: No height and weight on file for this encounter.    FAMILY HISTORY: A brief family medical history was reviewed.  REVIEW OF SYSTEMS: The review of systems negative for new eye, ear, heart, lung, liver, spleen, gastrointestinal, bone, muscle, integumentary, endocrinologic, brain or psychiatric issues except as noted above.  PHYSICAL EXAMINATION:   General: The patient is oriented to person, place and time at an age-appropriate manner.   HEENT: The facial features are normal and symmetric. The ears are of normal position and configuration and hearing is grossly normal.  The tongue protrudes normally without fasciculations.  Neck: The neck  appears to be supple with full range of motion  Chest: The chest is of normal configuration. There is no tachypnea or other visible abnormality.  Heart: The patient appears to be well perfused, and in no apparent distress.  Abdomen: Not examined.  Extremities: The extremities are of normal configuration.  Back: Not  examined,  Integument: The  visible portion of the integument is  of normal appearance without significant changes in pigmentation, birthmarks, or lesions.  Neuromuscular:  Mental Status Exam: Alert, awake. Fully oriented. No dysarthria; speech of normal fluency.  Cranial Nerves: EOMs intact, no nystagmus, facial movements symmetric.   Motor: There appears to be normal movement in all four extremities, no atrophy or fasciculations. No tremors.  Gait: By visual examination, there appears to be normal gait; normal arm swing and stance.    LABORATORY RESULTS: Laboratory studies from the past year were reviewed.    ASSESSMENT:  Carnitine deficiency - reported carrier status for carnitine uptake defect  Genotype not available for review today  Chronic fatigue    PLAN/RECOMMENDATIONS:   Recommend supplementation with Carnitor at 1 gram twice a day. Since Velvet prefers liquid over tablets, prescription will be sent to the preferred pharmacy. Low serum carnitine at 10 or lower, predisposes her to hypoglycemia and needs to be corrected with proper supplementation.  Obtain DORETHA to get the genotype from [facility].  We will obtain free carnitine and urine carnitine in 1 month. Individuals with carrier status for carnitine deficiency do not need life long supplementation, but may require occasional supplementation periods to avoid very low levels as seen in Velvet.  A urine organic acid will also be obtained to evaluate any additional etiologies for her carnitine deficiency.  If free carnitine normalizes with one month Carnitor intake, supplementation could be stopped as long as other secondary etiologies have been excluded. However, it is recommended to re-check the carnitine level every 1-2 years for Velvet.  We will see Velvet back in the clinic in 1 year or sooner pending test results.    With warmest regards,       Monty Reis MD     Division of Genetics and Metabolism    Appointments:  [phone]      Monday afternoons: Metabolic/Lysosomal storage clinic              [facility]: [phone]/ [phone]               [facility] laboratory: [phone]    Nurse Coordinator, Metabolism and Genetics:  Debbie Humphrey, RN, [phone]    Pharmacotherapy Consultant:  Maribel Christianson, PharmD, Pharmacotherapy for Metabolic Disorders ([facility]): [phone]    Genetic Counselor:  Leti Reynoso MS, [facility] (Genetic test Results): [phone]    Metabolic Dietician:  Ofelia Mcginnis, Registered Dietician: [phone]    [facility] Clinic Scheduler:  Ailyn Khoury, [phone]    Copies to:      Physician No Ref-Primary  No address on file    Velvet Espinoza  [address] [place] [id number]    Dr. Mari Conde MD  [address] [address]      70 minutes spent by me on the date of the encounter doing chart review, history and exam, documentation and further activities per the note

## 2024-01-29 NOTE — PROGRESS NOTES
Velvet is a 38 year old who is being evaluated via a billable video visit.      How would you like to obtain your AVS? MyChart  If the video visit is dropped, the invitation should be resent by: Text to cell phone: [phone]  Will anyone else be joining your video visit? Erica Cardona, EMT

## 2024-01-29 NOTE — LETTER
2024      RE: Velvet Espinoza  [address] [place] [id number]     Dear Colleague,    Thank you for the opportunity to participate in the care of your patient, Velvet Espinoza, at the [facility] at [facility]. Please see a copy of my visit note below.    Name:  Velvet Espinoza  :   1985  MRN:   [id number]  Date of service: 2024  Primary Provider: No Ref-Primary, Physician  Referring Provider: Mari Conde    Presenting Information:  Velvet, a 38 year old female, was seen was seen for a video visit at the [facility] for evaluation of low carnitine. We met with Velvet at the request of Dr. Reis to obtain a family history and to discuss the genetics and inheritance of primary carnitine deficiency.        Family History:   A three generation pedigree was obtained today. A copy is located in the media tab and full details are available in the pedigree section of the history tab. 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 We are working on obtaining her genetic testing report from [facility]. Refer to Dr. Reis's note for a more detailed personal history.   Children:   Son (9y) has stomach issues, but is otherwise healthy. He had a normal [place]  screen.  Son (5y) is healthy. He had a normal [place]  screen.  Daughter (2y) had an abnormal  screen for low carnitine. She was seen by [facility] and is also reportedly a carrier for primary carnitine deficiency.   Siblings:  Brother (43y) is healthy. His two children are healthy.   Maternal Relatives:  Mother (72y) has fibromyalgia and IBS.  Aunt with breast cancer diagnosed at age 50.  Cousin with rheumatoid  arthritis.  Cousin with schizophrenia.   Other aunts, uncle, and cousins are healthy.   Grandmother passed away at age 91 from congestive heart disease.  Grandfather passed away at age 96. He had dementia and Parkinson's disease.   Paternal Relatives:  Father (77y) has an idiopathic tremor.  Aunt passed away in her 60s. She had a brain tumor diagnosed at age 19 and was impaired from surgery.   Aunt with an apnea episode in her 40s which caused brain damage.   Other aunt and cousins are healthy.  Grandmother passed away at age 95 from breast cancer diagnosed in her 80s.  Grandfather passed away from colon cancer in his 80s.     The family history is otherwise negative for abnormal  screens, hearing loss, vision loss, intellectual disability, developmental delay, short stature, muscle weakness, infertility, multiple miscarriages, stillbirth, birth defects, sudden death, and known genetic disorders. Consanguinity is denied.      Discussion and Assessment:  Genes are long stretches of DNA that are responsible for how our bodies look and how our bodies work.  Our genes are inherited on structures called chromosomes of which we have 23 pairs.  One copy of each chromosome in a pair is inherited from the mother and one is inherited from the father.  Changes in the DNA sequence of a gene, called variants, as well as changes within the chromosomes can cause the signs and symptoms of a genetic condition.     Primary carnitine deficiency is typically caused by disease-causing variants in a gene called FAD21S1. The IMR32P4 gene is normally important for creating a protein that brings carnitine into cells where it is necessary for our cells to create energy for the body. Variants in the DGJ12V5 gene disrupt this process, causing the signs and symptoms of primary carnitine deficiency. These symptoms can vary greatly between affected individuals, but can include muscle weakness, low blood sugar, vomiting, cardiomyopathy, and  in severe untreated cases, sudden cardiac death. Some affected individuals are asymptomatic.    Primary carnitine deficiency is considered an autosomal recessive condition. This means that to be affected an individual needs a disease-causing variant in both copies of the AFT96C4 gene (one inherited from each parent). Individuals with just one variant are said to be a carrier for this condition. However, some carriers also have milder carnitine deficiency and may experience mild symptoms, especially during times of illness or other stressors. These people are called manifesting carriers. If two individuals are both carriers for primary carnitine deficiency, each child between the couple has a 25% chance of being affected with primary carnitine deficiency, a 50% chance of being a carrier, and a 25% chance of being unaffected and not a carrier.     Per Velvet, she is a carrier for primary carnitine deficiency, meaning she was found to have one disease-causing variant in QTC01A6. Because she has been found to have low levels of carnitine and has had episodes of hypoglycemia, she is likely a manifesting carrier. Manifesting carriers will sometimes have low levels of carnitine and other times, it will be in the normal range. This is likely why Velvet's sons had normal  screens while her daughter's was abnormal. Refer to Dr. Reis's note for more detail about management.     Velvet's daughter is also reportedly a carrier for primary carnitine deficiency. If she develops any symptoms, she should be seen by the genetics and metabolism team here or at [facility] to consider starting her on carnitine supplementation. She could only have a child with primary carnitine deficiency if her future reproductive partner is also a carrier. When she is an adult and considering starting a family of her own, this should be reviewed with her and carrier testing can be offered to her partner to better understand the risk to potential  children.     Velvet's sons each have a 50% chance of also having inherited the RDO54S4 variant. Typically, we would recommend waiting until they are adults and considering starting their own families to determine their carrier status because it is most important for reproductive knowledge. However, if they develop any concerning symptoms, we could do testing for them in childhood. The only way any of Velvet's son could be affected with primary carnitine deficiency is if her partner is also a carrier. If the family was concerned about this, Velvet's partner could obtain his own testing.     Other family members also have a chance of being carriers for primary carnitine deficiency and adult family member can consider their own genetic testing. Once we have a copy of [facility] genetic testing results, I am happy to coordinate this testing for interested family members. They can also find a local genetic counselor at [email or web address].     Velvet did not have additional questions today, but she is encouraged to reach out if questions come up.       Plan:  We will try to obtain [facility] genetic [facility] results from [facility].     Follow-up as recommended by Dr. Reis.     Additional genetic counseling for Velvet and her family as needed in the future.        Leti Reynoso [facility]  Genetic Counselor  SUL[facility] Avila   Phone: [phone]    This visit was co-counseled by Genetic Counseling Student, Misty Payne.     Approximate Time Spent in Consultation: 21 min

## 2024-03-01 ENCOUNTER — LAB (OUTPATIENT)
Dept: LAB | Facility: CLINIC | Age: 39
End: 2024-03-01
Payer: COMMERCIAL

## 2024-03-01 DIAGNOSIS — R79.89 LOW SERUM CARNITINE BEYOND NEONATAL PERIOD: ICD-10-CM

## 2024-03-01 LAB
CREAT UR-MCNC: 39.1 MG/DL
Lab: NORMAL
PERFORMING LABORATORY: NORMAL
SPECIMEN STATUS: NORMAL
TEST NAME: NORMAL

## 2024-03-01 PROCEDURE — 83918 ORGANIC ACIDS TOTAL QUANT: CPT

## 2024-03-01 PROCEDURE — 82570 ASSAY OF URINE CREATININE: CPT

## 2024-03-01 PROCEDURE — 99000 SPECIMEN HANDLING OFFICE-LAB: CPT

## 2024-03-01 PROCEDURE — 36415 COLL VENOUS BLD VENIPUNCTURE: CPT

## 2024-03-04 LAB
ACYLCARNITINE SERPL-SCNC: 9 UMOL/L
CARN ESTERS/C0 SERPL-SRTO: 0.5 {RATIO}
CARNITINE FREE SERPL-SCNC: 19 UMOL/L
CARNITINE SERPL-SCNC: 28 UMOL/L

## 2024-03-05 ENCOUNTER — MYC MEDICAL ADVICE (OUTPATIENT)
Dept: PEDIATRICS | Facility: CLINIC | Age: 39
End: 2024-03-05
Payer: COMMERCIAL

## 2024-03-05 DIAGNOSIS — R79.89 LOW SERUM CARNITINE BEYOND NEONATAL PERIOD: Primary | ICD-10-CM

## 2024-03-05 LAB — MAYO MISC RESULT: ABNORMAL

## 2024-03-05 RX ORDER — LEVOCARNITINE 1 G/10ML
1000 SOLUTION ORAL 3 TIMES DAILY
Qty: 900 ML | Refills: 3 | Status: SHIPPED | OUTPATIENT
Start: 2024-03-05 | End: 2024-07-09

## 2024-03-08 LAB
2ME-CITRATE/CREAT UR: 0 UG/MG CR (ref 0–4)
2OH-ISOCAPROATE/CREAT UR: 0 UG/MG CR (ref 0–4)
3-OH 3ME GLUTARIC, UR: 0 UG/MG CR (ref 0–40)
3ME-CROTONYLGLYCINE/CREAT UR: 0 UG/MG CR (ref 0–4)
3OH-ISOVALERATE/CREAT UR: 0 UG/MG CR (ref 0–50)
3OH-PROPIONATE/CREAT UR: 0 UG/MG CR (ref 0–4)
4OH-PHENYLLACTATE/CREAT UR-RTO: 0 UG/MG CR (ref 0–15)
4OH-PHENYLPYRUVATE/CREAT UR-SRTO: 0 UG/MG CR (ref 0–28)
5OH-HEXANOATE/CREAT UR: 0 UG/MG CR (ref 0–6)
5OXOPROLINE/CREAT UR: 0 UG/MG CR (ref 0–70)
7OH-OCTANOATE/CREAT UR-SRTO: 0 UG/MG CR (ref 0–4)
A-KETOGLUT/CREAT UR: 0 UG/MG CR (ref 0–476)
A-OH-BUTYR/CREAT UR: 0 UG/MG CR (ref 0–4)
ACETOACET/CREAT UR: 0 UG/MG CR (ref 0–6)
ADIPATE/CREAT UR: 0 UG/MG CR (ref 0–29)
B-OH-BUTYR/CREAT UR: 0 UG/MG CR (ref 0–15)
CITRATE/CREAT UR: 60 UG/MG CR (ref 0–1500)
DEPRECATED N-AC-ASP/CREAT UR: 0 UG/MG CR (ref 0–4)
ETHYLMALONATE/CREAT 24H UR: 0 UG/MG CR (ref 0–21)
FUMARATE/CREAT UR: 0 UG/MG CR (ref 0–10)
G-OH-BUTYR/CREAT UR: 0 UG/MG CR (ref 0–4)
GLUTARATE/CREAT UR: 0 UG/MG CR (ref 0–20)
GLUTARATE/CREAT UR: 0 UG/MG CR (ref 0–4)
GLUTARATE/CREAT UR: 0 UG/MG CR (ref 0–6)
GLYCERATE/CREAT UR: 0 UG/MG CR (ref 0–4)
GLYOXYLATE/CREAT UR: 0 UG/MG CR (ref 0–59)
HEXANOYLGLY/CREAT UR: 0 UG/MG CR (ref 0–4)
ISOCITRATE/CREAT UR: 0 UG/MG CR (ref 0–140)
ISOVALERYLGLY/CREAT UR: 0 UG/MG CR (ref 0–10)
LACTATE/CREAT UR: 0 UG/MG CR (ref 0–132)
METHYLMALONATE/CREAT UR: 0 UG/MG CR (ref 0–14)
ORGANIC ACIDS PATTERN UR-IMP: NORMAL
ORGANIC ACIDS UR-MCNC: 0 UG/MG CR (ref 0–4)
OXALATE/CREAT UR: 0 UG/MG CR (ref 0–300)
PHENYLACETATE/CREAT UR-SRTO: 0 UG/MG CR (ref 0–4)
PHENYLACETATE/CREAT UR: 0 UG/MG CR (ref 0–325)
PHENYLLACTATE/CREAT UR: 0 UG/MG CR (ref 0–4)
PHENYLPYRUVATE/CREAT UR: 0 UG/MG CR (ref 0–4)
PPG/CREAT UR: 0 UG/MG CR (ref 0–4)
PROPIONYLGLY/CREAT UR: 0 UG/MG CR (ref 0–4)
PYRUVATE/CREAT UR: 0 UG/MG CR (ref 0–40)
SEBACATE/CREAT UR: 0 UG/MG CR (ref 0–4)
SUBERATE/CREAT UR: 0 UG/MG CR (ref 0–19)
SUBERYLGLY/CREAT UR: 0 UG/MG CR (ref 0–4)
SUCCINATE/CREAT UR: 0 UG/MG CR (ref 0–120)
TIGLYLGLY/CREAT UR: 0 UG/MG CR (ref 0–10)

## 2024-04-05 ENCOUNTER — LAB (OUTPATIENT)
Dept: LAB | Facility: CLINIC | Age: 39
End: 2024-04-05
Payer: COMMERCIAL

## 2024-04-05 DIAGNOSIS — R79.89 LOW SERUM CARNITINE BEYOND NEONATAL PERIOD: ICD-10-CM

## 2024-04-05 PROCEDURE — 36415 COLL VENOUS BLD VENIPUNCTURE: CPT

## 2024-04-05 PROCEDURE — 99000 SPECIMEN HANDLING OFFICE-LAB: CPT

## 2024-04-11 LAB
ACYLCARNITINE SERPL-SCNC: 7 UMOL/L
CARN ESTERS/C0 SERPL-SRTO: 0.4 {RATIO}
CARNITINE FREE SERPL-SCNC: 16 UMOL/L
CARNITINE SERPL-SCNC: 23 UMOL/L

## 2024-07-09 DIAGNOSIS — R79.89 LOW SERUM CARNITINE BEYOND NEONATAL PERIOD: ICD-10-CM

## 2024-07-09 RX ORDER — LEVOCARNITINE 1 G/10ML
1000 SOLUTION ORAL 4 TIMES DAILY
Qty: 1200 ML | Refills: 3 | Status: SHIPPED | OUTPATIENT
Start: 2024-07-09

## 2024-08-06 NOTE — PROGRESS NOTES
"                 OUTPATIENT METABOLIC INITIAL VISIT          Date: 2024      Patient:  Velvet Espinoza   :   1985   MRN:     [id number]      Velvet Espinoza  [address] [facility] [address]    Dear Dr. Conde and Velvet Espinoza,    Thank you for sending Velvet Espinoza to the [facility] Monday \"Metabolic clinic\" for consultation and treatment of:    1. 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 Both Velvet and her daughter were seen by Dr. Torie Damon at [facility]. Velvet's initial free carnitine in  was 16 which went up to 34 on carnitine supplementation of 1 gram three times a day. However, Velvet did not find any change since she was asymptomatic to begin with and chose to stop supplementation after a month.    Recently, she had an episode of abdominal pain of unknown etiology during the evaluation of which, the carnitine deficiency was brought up again. On recheck of her carnitine level in 2023, her free carnitine was found to be at 10 that led to this current referral to metabolism.    Velvet reports chronic fatigue. She also reports occasional episodes of shaking and sweating suggestive of hypoglycemia that resolves on food intake. She never had her blood glucose level during these episodes. No history of any exercise intolerance  or chest pain.     Interval history:  She was started on carnitine supplementation @ 3 g/day, on recheck, the free carnitine came up to 16.  Velvet reports that she has been 98-99% compliant on this medication. Since she was having fatigue, the dose was increased to 4 g/day in 2024.     Several times a day feels " shaky and weak. This tends to first occur 20min after breakfast and other times occur after not eating for awhile but notes this has been going on since she was a teen but feels these episodes are increasing in frequency to daily from just a few times a week. Increase in frequency started a couple weeks ago. These episodes improve with eating.     Continue to feel fatigue, has not noticed any improvement in this since starting carnitine. Has been on carnitine 40ml/day for 1 month. Did have ~ 1 week break due to issue with refill and noticed some worsening fatigue when off and increased episodes of shakiness/weakness. Sleeps 8-9 hours, but if she could, would prefer to take nap during day and prior to kids would sleep 12 hours a day. No muscle aches or cramping. No palpitations or arrhythmias. No notable worsening shortness of breath with exercise. Feels endurance is worse compared to peers of similar age/fitness, no significant change since starting carnitine but activity limited secondary to back injury from fall 1 year ago at work. Slipped on wet floor and sees PT and chiropractor and ortho for this.     Medications:  Current Outpatient Medications   Medication Sig Dispense Refill    citalopram (CELEXA) 10 MG tablet       levOCARNitine (CARNITOR) 1 GM/10ML solution Take 10 mLs (1,000 mg) by mouth 4 times daily 1200 mL 3    fluocin-hydroquinone-tretinoin 0.01-4-0.05 % CREA Externally apply topically At Bedtime 30 g 11    hydrocortisone 2.5 % ointment Apply topically 2 times daily (Patient not taking: Reported on 7/30/2019) 30 g 4    hydroquinone 4 % CREA Externally apply topically At Bedtime (Patient not taking: Reported on 7/30/2019) 56.8 g 3    tretinoin (RETIN-A) 0.1 % cream Apply topically At Bedtime (Patient not taking: Reported on 7/30/2019) 45 g 4     No current facility-administered medications for this visit.     Allergies:  No Known Allergies    Physical Examination:  Blood pressure 116/76, pulse 70,  "resp. rate 20, height 5' 7.99\" (172.7 cm), weight 144 lb 13.5 oz (65.7 kg), head circumference 57 cm (22.44\"), SpO2 99%.      FAMILY HISTORY: A brief family medical history was reviewed.  REVIEW OF SYSTEMS: Constipation, chronic. The review of systems negative for new eye, ear, heart, lung, liver, spleen, bone, muscle, integumentary, endocrinologic, brain or psychiatric issues except as noted above.  PHYSICAL EXAMINATION:   General: The patient is oriented to person, place and time   HEENT: The facial features are normal and symmetric. The ears are of normal position and configuration and hearing is grossly normal.  Neck: The neck  appears to be supple with full range of motion  Chest: The chest is of normal configuration. There is no tachypnea or other visible abnormality.  Heart: The patient appears to be well perfused, and in no apparent distress. Normal rate and rhythm.   Abdomen: Soft, non-distended.   Extremities: The extremities are of normal configuration.  Back: Not examined.  Integument: The  visible portion of the integument is  of normal appearance without significant changes in pigmentation, birthmarks, or lesions.  Neuromuscular:  Mental Status Exam: Alert, awake. Fully oriented. No dysarthria; speech of normal fluency.  Cranial Nerves: EOMs intact, no nystagmus, facial movements symmetric.   Motor: There appears to be normal movement in all four extremities, no atrophy or fasciculations. No tremors.    LABORATORY RESULTS: Laboratory studies from the past year were reviewed  Carnitine, free: 16 on 24   Carnitine, total: 23 on 23   ASSESSMENT:   Velvet is a 39 y.o. female found to be carnitine deficiency carrier currently on carnitine supplementation after third child was found to have low carnitine on  screen. She is currently on 40 ml/day (taken 4x a day, 10 ml each time) carnitine supplementation and has been this increased dose for 1 month with no improvement of her fatigue. She was " previously on 30 ml/day and that time, also had no improvement of her symptoms which are primarily fatigue, and notably, her free and total carnitine levels actually decreased. Will recheck free and total carnitine levels today. Typically, carriers of carnitine deficiency respond well clinically and in laboratory levels to supplementation. Velvet's continued fatigue despite high dose carnitine and previously persistent low carnitine levels despite carnitine supplementation is concerning for full carnitine deficiency. Given this, will proceed to genome sequencing. She previously only had exome sequencing done and there may be additional genomic changes contributing to her fatigue and low carnitine levels despite high dose supplementation. She was seen by our genetic counselor for this and will pursue further testing.   Also discussed with Velvet risk for cardiomyopathy in patients with carnitine deficiency. She does not report shortness of breath, palpitations, or arrhythmias but does feel her cardio endurance is poor. However, given risk for cardiomyopathy, recommend baseline echo and EKG be obtained. Will order for these to be done at [facility] given it is much closer to where she lives.   Regarding her episodes of shakiness and weakness, given they always improve after she eats something and seem to occur after fasting, they are concerning for hypoglycemic events. Adults with carnitine deficiency do not typically have hypoglycemia but this is commonly seen in infants with carnitine deficiency. Recommend Velvet purchase an OTC glucose monitor to see if these episodes truly are hypoglycemic. If she is found to have hypoglycemia, may need to see endocrinology to evaluate/rule out for any other possible causes of hypoglycemia as this would not be typical of carnitine deficiency in an adult.   Also discussed with Velvet although her children had normal  screen, would recommend her 3 children have a  free carnitine level and total carnitine level drawn at the pediatrician.   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   Recommend OTC glucose monitor since the symptoms are suggestive of hypoglycemia, please record a log of fasting morning glucose, glucose level during episodes of shakiness/weakness, diet, and when last ate prior to episodes. Please send me the log record by early next week.   Carnitine free and total level today   Given the fact that her free carnitine is still in the teens despite supplementation with 3 gms, this is not s/o carrier status. As a result, retesting for [id number] on a genome backbone is recommended as the next step. Obtain prior auth for genomic testing   Echo and EKG ordered (for [facility])   We will see Velvet back in the clinic in 1 year or sooner pending test results.    Lisa Castano MD   Pediatrics PGY1     Physician Attestation   I, Monty Reis, was present with the resident physician who participated in the service and in the documentation of the note.  I have edited the note, verified the history and personally performed the physical exam and medical decision making.  I agree with the assessment and plan of care as documented in the note.      Items personally reviewed: growth parameters, labs and imaging and agree with the interpretation documented in the note.     With warmest regards,     Monty Reis MD     Division of Genetics and Metabolism    Appointments: [phone]      Monday afternoons: Metabolic/Lysosomal storage clinic              [facility] laboratory: [phone]/ [phone]               [facility] laboratory: [phone]    Nurse Coordinator, Metabolism and Genetics:  Debbie Humphrey RN,  [phone]    Pharmacotherapy Consultant:  Maribel Christianson, PharmD, Pharmacotherapy for Metabolic Disorders (PIMD): [phone]    Genetic Counselor:  Leti Reynoso MS, [facility] [place]Genetic test Results): [phone]    Metabolic Dietician:  Ofelia Mcginnis, Registered Dietician: [phone]    Advanced Therapies Clinic Scheduler:  Ailyn Khoury, [phone]    Copies to:      Physician No Ref-Primary  No address on file    Velvet ROMO Alexis  [address] [place] [id number]    [place] [address]      50 minutes spent by me on the date of the encounter doing chart review, history and exam, documentation and further activities per the note  The longitudinal plan of care for the diagnosis(es)/condition(s) as documented were addressed during this visit. Due to the added complexity in care, I will continue to support Velvet in the subsequent management and with ongoing continuity of care.

## 2024-08-07 ENCOUNTER — OFFICE VISIT (OUTPATIENT)
Dept: CONSULT | Facility: CLINIC | Age: 39
End: 2024-08-07
Attending: PEDIATRICS
Payer: COMMERCIAL

## 2024-08-07 VITALS
HEART RATE: 70 BPM | BODY MASS INDEX: 21.95 KG/M2 | SYSTOLIC BLOOD PRESSURE: 116 MMHG | OXYGEN SATURATION: 99 % | HEIGHT: 68 IN | RESPIRATION RATE: 20 BRPM | DIASTOLIC BLOOD PRESSURE: 76 MMHG | WEIGHT: 144.84 LBS

## 2024-08-07 DIAGNOSIS — E71.40 CARNITINE DEFICIENCY (H): ICD-10-CM

## 2024-08-07 DIAGNOSIS — Z71.83 ENCOUNTER FOR NONPROCREATIVE GENETIC COUNSELING: ICD-10-CM

## 2024-08-07 DIAGNOSIS — Z15.89: Primary | ICD-10-CM

## 2024-08-07 DIAGNOSIS — R79.89 LOW SERUM CARNITINE AFTER NEWBORN PERIOD: ICD-10-CM

## 2024-08-07 LAB
INTERPRETATION: ABNORMAL
LAB PDF RESULT: ABNORMAL
LOCATION OF TASK: ABNORMAL
SIGNIFICANT RESULTS: ABNORMAL
SPECIMEN DESCRIPTION: ABNORMAL
TEST DETAILS, MDL: ABNORMAL

## 2024-08-07 PROCEDURE — 36415 COLL VENOUS BLD VENIPUNCTURE: CPT | Performed by: PEDIATRICS

## 2024-08-07 PROCEDURE — 99215 OFFICE O/P EST HI 40 MIN: CPT | Mod: GC | Performed by: PEDIATRICS

## 2024-08-07 PROCEDURE — G2211 COMPLEX E/M VISIT ADD ON: HCPCS | Mod: GC | Performed by: PEDIATRICS

## 2024-08-07 PROCEDURE — 96040 HC GENETIC COUNSELING, EACH 30 MINUTES: CPT | Performed by: GENETIC COUNSELOR, MS

## 2024-08-07 PROCEDURE — 99213 OFFICE O/P EST LOW 20 MIN: CPT | Performed by: PEDIATRICS

## 2024-08-07 NOTE — LETTER
"2024      RE: Velvet Espinoza  [address] [facility] [address]     Dear Colleague,    Thank you for the opportunity to participate in the care of your patient, Velvet Espinoza, at the [facility] at [facility]. Please see a copy of my visit note below.                     OUTPATIENT METABOLIC INITIAL VISIT          Date: 2024      Patient:  Velvet Espinoza   :   1985   MRN:     [id number]      Velvet Espinoza  [address] [facility] [address]    Dear Dr. Conde and Velvet Espinoza,    Thank you for sending Velvet Espinoza to the [facility] Monday \"Metabolic clinic\" for consultation and treatment of:    1. 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 Both Velvet and her daughter were seen by Dr. Torie Damon at [facility]. Velvet's initial free carnitine in  was 16 which went up to 34 on carnitine supplementation of 1 gram three times a day. However, Velvet did not find any change since she was asymptomatic to begin with and chose to stop supplementation after a month.    Recently, she had an episode of abdominal pain of unknown etiology during the evaluation of which, the carnitine deficiency was brought up again. On recheck of her carnitine level in 2023, her free carnitine was found to be at 10 that led to this current referral to metabolism.    Velvet reports chronic fatigue. She also reports occasional episodes of shaking and sweating suggestive of hypoglycemia that resolves on food intake. She never had her blood glucose level during these " episodes. No history of any exercise intolerance  or chest pain.     Interval history:  She was started on carnitine supplementation @ 3 g/day, on recheck, the free carnitine came up to 16.  Velvet reports that she has been 98-99% compliant on this medication. Since she was having fatigue, the dose was increased to 4 g/day in April 2024.     Several times a day feels shaky and weak. This tends to first occur 20min after breakfast and other times occur after not eating for awhile but notes this has been going on since she was a teen but feels these episodes are increasing in frequency to daily from just a few times a week. Increase in frequency started a couple weeks ago. These episodes improve with eating.     Continue to feel fatigue, has not noticed any improvement in this since starting carnitine. Has been on carnitine 40ml/day for 1 month. Did have ~ 1 week break due to issue with refill and noticed some worsening fatigue when off and increased episodes of shakiness/weakness. Sleeps 8-9 hours, but if she could, would prefer to take nap during day and prior to kids would sleep 12 hours a day. No muscle aches or cramping. No palpitations or arrhythmias. No notable worsening shortness of breath with exercise. Feels endurance is worse compared to peers of similar age/fitness, no significant change since starting carnitine but activity limited secondary to back injury from fall 1 year ago at work. Slipped on wet floor and sees PT and chiropractor and ortho for this.     Medications:  Current Outpatient Medications   Medication Sig Dispense Refill     citalopram (CELEXA) 10 MG tablet        levOCARNitine (CARNITOR) 1 GM/10ML solution Take 10 mLs (1,000 mg) by mouth 4 times daily 1200 mL 3     fluocin-hydroquinone-tretinoin 0.01-4-0.05 % CREA Externally apply topically At Bedtime 30 g 11     hydrocortisone 2.5 % ointment Apply topically 2 times daily (Patient not taking: Reported on 7/30/2019) 30 g 4     hydroquinone 4  "% CREA Externally apply topically At Bedtime (Patient not taking: Reported on 7/30/2019) 56.8 g 3     tretinoin (RETIN-A) 0.1 % cream Apply topically At Bedtime (Patient not taking: Reported on 7/30/2019) 45 g 4     No current facility-administered medications for this visit.     Allergies:  No Known Allergies    Physical Examination:  Blood pressure 116/76, pulse 70, resp. rate 20, height 5' 7.99\" (172.7 cm), weight 144 lb 13.5 oz (65.7 kg), head circumference 57 cm (22.44\"), SpO2 99%.      FAMILY HISTORY: A brief family medical history was reviewed.  REVIEW OF SYSTEMS: Constipation, chronic. The review of systems negative for new eye, ear, heart, lung, liver, spleen, bone, muscle, integumentary, endocrinologic, brain or psychiatric issues except as noted above.  PHYSICAL EXAMINATION:   General: The patient is oriented to person, place and time   HEENT: The facial features are normal and symmetric. The ears are of normal position and configuration and hearing is grossly normal.  Neck: The neck  appears to be supple with full range of motion  Chest: The chest is of normal configuration. There is no tachypnea or other visible abnormality.  Heart: The patient appears to be well perfused, and in no apparent distress. Normal rate and rhythm.   Abdomen: Soft, non-distended.   Extremities: The extremities are of normal configuration.  Back: Not examined.  Integument: The  visible portion of the integument is  of normal appearance without significant changes in pigmentation, birthmarks, or lesions.  Neuromuscular:  Mental Status Exam: Alert, awake. Fully oriented. No dysarthria; speech of normal fluency.  Cranial Nerves: EOMs intact, no nystagmus, facial movements symmetric.   Motor: There appears to be normal movement in all four extremities, no atrophy or fasciculations. No tremors.    LABORATORY RESULTS: Laboratory studies from the past year were reviewed  Carnitine, free: 16 on 4/5/24   Carnitine, total: 23 on 4/5/23 "   ASSESSMENT:   Velvet is a 39 y.o. female found to be carnitine deficiency carrier currently on carnitine supplementation after third child was found to have low carnitine on  screen. She is currently on 40 ml/day (taken 4x a day, 10 ml each time) carnitine supplementation and has been this increased dose for 1 month with no improvement of her fatigue. She was previously on 30 ml/day and that time, also had no improvement of her symptoms which are primarily fatigue, and notably, her free and total carnitine levels actually decreased. Will recheck free and total carnitine levels today. Typically, carriers of carnitine deficiency respond well clinically and in laboratory levels to supplementation. Velvet's continued fatigue despite high dose carnitine and previously persistent low carnitine levels despite carnitine supplementation is concerning for full carnitine deficiency. Given this, will proceed to genome sequencing. She previously only had exome sequencing done and there may be additional genomic changes contributing to her fatigue and low carnitine levels despite high dose supplementation. She was seen by our genetic counselor for this and will pursue further testing.   Also discussed with Velvet risk for cardiomyopathy in patients with carnitine deficiency. She does not report shortness of breath, palpitations, or arrhythmias but does feel her cardio endurance is poor. However, given risk for cardiomyopathy, recommend baseline echo and EKG be obtained. Will order for these to be done at [facility] given it is much closer to where she lives.   Regarding her episodes of shakiness and weakness, given they always improve after she eats something and seem to occur after fasting, they are concerning for hypoglycemic events. Adults with carnitine deficiency do not typically have hypoglycemia but this is commonly seen in infants with carnitine deficiency. Recommend Velvet purchase an OTC glucose  monitor to see if these episodes truly are hypoglycemic. If she is found to have hypoglycemia, may need to see endocrinology to evaluate/rule out for any other possible causes of hypoglycemia as this would not be typical of carnitine deficiency in an adult.   Also discussed with Velvet although her children had normal  screen, would recommend her 3 children have a free carnitine level and total carnitine level drawn at the pediatrician.   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   Recommend OTC glucose monitor since the symptoms are suggestive of hypoglycemia, please record a log of fasting morning glucose, glucose level during episodes of shakiness/weakness, diet, and when last ate prior to episodes. Please send me the log record by early next week.   Carnitine free and total level today   Given the fact that her free carnitine is still in the teens despite supplementation with 3 gms, this is not s/o carrier status. As a result, retesting for [id number] on a genome backbone is recommended as the next step. Obtain prior auth for genomic testing   Echo and EKG ordered (for [facility])   We will see Velvet back in the clinic in 1 year or sooner pending test results.    Lisa Castano MD   Pediatrics PGY1     Physician Attestation  I, Monty Reis, was present with the resident physician who participated in the service and in the documentation of the note.  I have edited the note, verified the history and personally performed the physical exam and medical decision making.  I agree with the assessment and plan of care as documented in the note.      Items personally reviewed: growth parameters, labs and imaging and agree with the interpretation documented in the note.     With warmest regards,      Monty Reis MD     Division of Genetics and Metabolism    Appointments: [phone]      Monday afternoons: Metabolic/Lysosomal storage clinic              [facility] laboratory: [phone]/ [phone]               [facility] laboratory: [phone]    Nurse Coordinator, Metabolism and Genetics:  Debbie Humphrey RN, [phone]    Pharmacotherapy Consultant:  Maribel Christianson, PharmD, Pharmacotherapy for Metabolic Disorders (PIMD): [phone]    Genetic Counselor:  Leti Reynoso MS, [facility] [place] (Genetic test Results): [phone]    Metabolic Dietician:  Ofelia Mcginnis, Registered Dietician: [phone]    Advanced Therapies Clinic Scheduler:  Ailyn Khoury, [phone]    Copies to:     Dr. Physician No Ref-Primary  No address on file    Velvet Espinoza  [address] [place] [id number]    Dr. Mari Conde MD  [place] [phone]      50 minutes spent by me on the date of the encounter doing chart review, history and exam, documentation and further activities per the note  The longitudinal plan of care for the diagnosis(es)/condition(s) as documented were addressed during this visit. Due to the added complexity in care, I will continue to support Velvet in the subsequent management and with ongoing continuity of care.            Please do not hesitate to contact me if you have any questions/concerns.     Sincerely,       Monty Reis MD

## 2024-08-07 NOTE — PATIENT INSTRUCTIONS
"Metabolism Clinic  [facility]  Pediatric Specialty Clinic ([facility])      Formula/ Diet   We did not make any changes to your diet       Medications   We did not make any changes to your medications today.      Emergency & Sick Calls   Keep your emergency letter with you at all times  (at work, in your vehicles, purse/bag and home, etc).  Consider making a medical alert bracelet.    If unresponsive or other life threatening medical emergency CALL 911.     If NOT ACTING NORMALLY such as: confused or sleepier than normal, having nausea or vomiting, not tolerating their formula or medications, breathing faster than normal, has a fever, diarrhea, or other parental concern CALL US IMMEDIATELY.     Call [phone] and ask the  to \"page Genetic Metabolic Physician on-call\"   If no one calls you back within 15 minutes call again.        Helpful Numbers   To schedule appointments:  Pediatric Call Center for [facility]: [phone]  Neuropsychology [phone]   Radiology/ Imaging/ Echocardiogram: [phone]   Services:   [phone]     For questions about medications/ supplies or non-urgent medical concerns:        Debbie ZHU, RN, PHN  Nurse Care Coordinator               Ph: [phone]        Ade Zhu APRN, CNP             Ph: [phone]    For questions about your nutrition:  Ofelia Mcginnis RD  Ph: [phone]    For questions about genetic counseling or genetic testing:      Ellyn Stern  Ph: [phone]              If you have not already done so consider signing up for [employer] by speaking with the person at the  on your way out or go to [email or web address] to sign up online.     You should receive a phone call about your next appointment. If you do not receive this within two weeks of your visit, please call [phone].     [employer] enables easy and confidential communication with your care team.        "

## 2024-08-07 NOTE — PROGRESS NOTES
Presenting information: Velvet is a 39 year old female with a history of low carnitine. She was previously seen in genetics clinic on 24 and evaluated in genetics clinic by Dr. Reis today. I met with Velvet at the request of Dr. Reis to discuss possible genetic contributions to her symptoms and to obtain informed consent for genetic testing.     Personal History: Velvet has a history of low carnitine. She underwent single gene testing for variants in the OWF74Q1 gene at [facility] in  which identified one pathogenic variant (c.1193C>T). See Dr. Reis's note for additional details.     Family History:   A three generation pedigree was obtained at Velvet's previous genetic counseling appointment. A copy is located in the media tab and full details are available in the pedigree section of the history tab. 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 We are working on obtaining her genetic testing report from [facility]. Addendum 24: A copy of Velvet's report was received and will be scanned into her chart. 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 Refer to Dr. Reis's note for a more detailed personal history.   Children:   Son (9y) has stomach issues, but is otherwise healthy. He had a normal [place]  screen.  Son (5y) is healthy. He had a normal [place]  screen.  Daughter (2y) had an abnormal  screen for low carnitine. She was seen by [facility] and is also reportedly a carrier for primary carnitine deficiency.   Siblings:  Brother (43y) is healthy. His two children are healthy.   Maternal Relatives:  Mother (72y) has fibromyalgia and IBS.  Aunt with breast cancer diagnosed at age 50.  Cousin with rheumatoid arthritis.  Cousin with schizophrenia.    Other aunts, uncle, and cousins are healthy.   Grandmother passed away at age 91 from congestive heart disease.  Grandfather passed away at age 96. He had dementia and Parkinson's disease.   Paternal Relatives:  Father (77y) has an idiopathic tremor.  Aunt passed away in her 60s. She had a brain tumor diagnosed at age 19 and was impaired from surgery.   Aunt with an apnea episode in her 40s which caused brain damage.   Other aunt and cousins are healthy.  Grandmother passed away at age 95 from breast cancer diagnosed in her 80s.  Grandfather passed away from colon cancer in his 80s.      The family history is otherwise negative for abnormal  screens, hearing loss, vision loss, intellectual disability, developmental delay, short stature, muscle weakness, infertility, multiple miscarriages, stillbirth, birth defects, sudden death, and known genetic disorders. Consanguinity is denied.     Discussion:   Primary carnitine deficiency due to changes in the SRT07L9 gene is inherited in an autosomal recessive manner. This means that to be affected an individual must inherit a variant in both copies of the FTG02Q3 gene (one from each parent).  Individuals with just one variant in the TFZ94Z9 gene are said to be carriers.  Carriers do not have symptoms but can have an affected child if their partner is also a carrier.  When both parents are carrier, with each pregnancy there is a 25% chance for the child to be affected, a 50% chance to be a carrier, and a 25% chance to be unaffected and not a carrier.     Velvet was found to be a carrier on her previous testing. However, her low carnitine levels suggest that she is affected with this condition. The testing technology used at [facility] is limited in the sense that it cannot detect insertions/deletions that are between 15 base pairs and full exon level with a high degree of accuracy. Additionally, [facility] does not look at all promoter regions, non coding  exons and non coding intronic regions. For this reason, this testing may have missed a second variant in the RSH45H3 gene.    We plan to order [id number] single gene testing at the [facility] Molecular diagnostics laboratory on a genome backbone. This lab will use different testing technology than [facility] [employer] [employer] with the goal of identifying a second variant that may have been previously missed.    Risks, benefits, limitations and possible results of this testing were reviewed. Velvet expressed an excellent understanding of this information and consented to this testing pending insurance approval.    Plan:   1. [id number] single gene testing on a genome backbone at the [facility] molecular diagnostics lab pending insurance approval.  2. Return pending results of genetic testing   3. Contact information was provided should any questions arise in the future.     Ellyn Stern MS [facility]  Genetic Counselor  Division of Genetics and Metabolism  p) [phone]  (f) [phone]    Total time spent in consultation with the family was approximately 25 minutes      Cc: No Letter

## 2024-08-07 NOTE — Clinical Note
2024      RE: Velvet Espinoza  [address] [facility] [address]     Dear Colleague,    Thank you for the opportunity to participate in the care of your patient, Velvet Espinoza, at the [facility] at [facility]. Please see a copy of my visit note below.    Presenting information: Velvet is a 39 year old female with a history of low carnitine. She was previously seen in genetics clinic on 24 and evaluated in genetics clinic by Dr. Reis today. I met with Velvet at the request of Dr. Reis to discuss possible genetic contributions to her symptoms and to obtain informed consent for genetic testing.     Personal History: Velvet has a history of low carnitine. She underwent single gene testing for variants in the KQJ73Z4 gene at [facility] in  which identified one pathogenic variant (c.1193C>T). See Dr. Reis's note for additional details.     Family History:   A three generation pedigree was obtained at Velvet's previous genetic counseling appointment. A copy is located in the media tab and full details are available in the pedigree section of the history tab. 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 We are working on obtaining her genetic testing report from [facility]. Addendum 24: A copy of Velvet's report was received and will be scanned into her chart. She is heterozygous for the [id number]: c.1193C>T (p.[id number]) pathogenic variant. Refer to Dr. Reis's note for a more detailed personal history.   Children:   Son (9y) has stomach issues, but is otherwise healthy. He had a normal [place]  screen.  Son (5y) is healthy. He had a normal [place]  screen.  Daughter (2y) had an abnormal   screen for low carnitine. She was seen by [facility] and is also reportedly a carrier for primary carnitine deficiency.   Siblings:  Brother (43y) is healthy. His two children are healthy.   Maternal Relatives:  Mother (72y) has fibromyalgia and IBS.  Aunt with breast cancer diagnosed at age 50.  Cousin with rheumatoid arthritis.  Cousin with schizophrenia.   Other aunts, uncle, and cousins are healthy.   Grandmother passed away at age 91 from congestive heart disease.  Grandfather passed away at age 96. He had dementia and Parkinson's disease.   Paternal Relatives:  Father (77y) has an idiopathic tremor.  Aunt passed away in her 60s. She had a brain tumor diagnosed at age 19 and was impaired from surgery.   Aunt with an apnea episode in her 40s which caused brain damage.   Other aunt and cousins are healthy.  Grandmother passed away at age 95 from breast cancer diagnosed in her 80s.  Grandfather passed away from colon cancer in his 80s.      The family history is otherwise negative for abnormal  screens, hearing loss, vision loss, intellectual disability, developmental delay, short stature, muscle weakness, infertility, multiple miscarriages, stillbirth, birth defects, sudden death, and known genetic disorders. Consanguinity is denied.     Discussion:   Primary carnitine deficiency due to changes in the EKS48I2 gene is inherited in an autosomal recessive manner. This means that to be affected an individual must inherit a variant in both copies of the MPJ98B3 gene (one from each parent).  Individuals with just one variant in the KVA84G0 gene are said to be carriers.  Carriers do not have symptoms but can have an affected child if their partner is also a carrier.  When both parents are carrier, with each pregnancy there is a 25% chance for the child to be affected, a 50% chance to be a carrier, and a 25% chance to be unaffected and not a carrier.     Velvet was found to be a carrier on her previous  testing. However, her low carnitine levels suggest that she is affected with this condition. The testing technology used at [facility] is limited in the sense that it cannot detect insertions/deletions that are between 15 base pairs and full exon level with a high degree of accuracy. Additionally, [employer] does not look at all promoter regions, non coding exons and non coding intronic regions. For this reason, this testing may have missed a second variant in the TOQ98S8 gene.    We plan to order [id number] single gene testing at the [facility] Molecular diagnostics laboratory on a genome backbone. This lab will use different testing technology than [facility] with the goal of identifying a second variant that may have been previously missed.    Risks, benefits, limitations and possible results of this testing were reviewed. Velvet expressed an excellent understanding of this information and consented to this testing pending insurance approval.    Plan:   1. [id number] single gene testing on a genome backbone at the [facility] molecular diagnostics lab pending insurance approval.  2. Return pending results of genetic testing   3. Contact information was provided should any questions arise in the future.     Ellyn Stern MS [facility]  Genetic Counselor  Division of Genetics and Metabolism  (p) [phone]  (f) [phone]    Total time spent in consultation with the family was approximately 25 minutes      Cc: No Letter       Please do not hesitate to contact me if you have any questions/concerns.     Sincerely,       Ellyn Stern GC

## 2024-08-07 NOTE — NURSING NOTE
"Chief Complaint   Patient presents with    RECHECK       Vitals:    08/07/24 1021   BP: 116/76   BP Location: Right arm   Patient Position: Sitting   Cuff Size: Adult Regular   Pulse: 70   Resp: 20   SpO2: 99%   Weight: 144 lb 13.5 oz (65.7 kg)   Height: 5' 7.99\" (172.7 cm)   HC: 57 cm (22.44\")       Robin Lujan  August 7, 2024    "

## 2024-08-13 LAB — INTERPRETATION: NORMAL

## 2024-08-20 ENCOUNTER — LAB (OUTPATIENT)
Dept: LAB | Facility: CLINIC | Age: 39
End: 2024-08-20
Payer: COMMERCIAL

## 2024-08-20 ENCOUNTER — DOCUMENTATION ONLY (OUTPATIENT)
Dept: LAB | Facility: CLINIC | Age: 39
End: 2024-08-20
Payer: COMMERCIAL

## 2024-08-20 DIAGNOSIS — R79.89 LOW SERUM CARNITINE AFTER NEWBORN PERIOD: ICD-10-CM

## 2024-08-20 DIAGNOSIS — Z15.89: Primary | ICD-10-CM

## 2024-08-20 PROCEDURE — G0452 MOLECULAR PATHOLOGY INTERPR: HCPCS | Mod: 26 | Performed by: STUDENT IN AN ORGANIZED HEALTH CARE EDUCATION/TRAINING PROGRAM

## 2024-08-20 NOTE — PROGRESS NOTES
Prior authorization for Velvet Espinoza's genetic testing is approved. eOOP <$300 so [facility] will proceed with testing. We will reach out with results when they are available.      Scout Chaudhary  Genomics Billing    [facility]  Molecular Diagnostics Laboratory  [address] [address]  [email or web address]  Office: [phone]  Fax:   Employed by [employer]

## 2024-08-27 ENCOUNTER — ANCILLARY PROCEDURE (OUTPATIENT)
Dept: CARDIOLOGY | Facility: CLINIC | Age: 39
End: 2024-08-27
Attending: PEDIATRICS
Payer: COMMERCIAL

## 2024-08-27 DIAGNOSIS — Z15.89: ICD-10-CM

## 2024-08-27 LAB — LVEF ECHO: NORMAL

## 2024-08-27 PROCEDURE — 93306 TTE W/DOPPLER COMPLETE: CPT | Performed by: INTERNAL MEDICINE

## 2024-08-27 PROCEDURE — 93000 ELECTROCARDIOGRAM COMPLETE: CPT | Performed by: INTERNAL MEDICINE

## 2024-09-08 LAB
ATRIAL RATE - MUSE: 61 BPM
DIASTOLIC BLOOD PRESSURE - MUSE: NORMAL MMHG
INTERPRETATION ECG - MUSE: NORMAL
P AXIS - MUSE: 65 DEGREES
PR INTERVAL - MUSE: 160 MS
QRS DURATION - MUSE: 76 MS
QT - MUSE: 418 MS
QTC - MUSE: 420 MS
R AXIS - MUSE: 67 DEGREES
SYSTOLIC BLOOD PRESSURE - MUSE: NORMAL MMHG
T AXIS - MUSE: 55 DEGREES
VENTRICULAR RATE- MUSE: 61 BPM

## 2024-09-11 ENCOUNTER — TELEPHONE (OUTPATIENT)
Dept: CONSULT | Facility: CLINIC | Age: 39
End: 2024-09-11
Payer: COMMERCIAL

## 2024-09-11 NOTE — TELEPHONE ENCOUNTER
Contacted Velvet to review the results of her SIW57W7 genetic testing. I explained that this test identified the same YPQ44W9 variant that was seen on her previous testing (c.1193C>T). No additional variants were identified. Dr Reis would like Velvet to schedule a one year follow up in genetics clinic. Our  Ailyn will reach out to facilitate this.     Ellyn Stern MS [facility]  Genetic Counselor  Division of Genetics and Metabolism  (p) [phone]  (f) [phone]

## 2024-10-02 ENCOUNTER — TELEPHONE (OUTPATIENT)
Dept: CONSULT | Facility: CLINIC | Age: 39
End: 2024-10-02
Payer: COMMERCIAL

## 2024-10-02 NOTE — TELEPHONE ENCOUNTER
LVM for patient to call back to schedule 1 year Genetics follow-up with Dr. Monty Reis, with GC visit 30 minutes prior, in August 2025.

## 2024-11-06 DIAGNOSIS — R79.89 LOW SERUM CARNITINE BEYOND NEONATAL PERIOD: ICD-10-CM

## 2025-02-04 RX ORDER — LEVOCARNITINE 1 G/10ML
SOLUTION ORAL
Qty: 1,200 ML | Refills: 0 | OUTPATIENT
Start: 2025-02-04

## 2025-03-09 ENCOUNTER — HEALTH MAINTENANCE LETTER (OUTPATIENT)
Age: 40
End: 2025-03-09

## 2025-08-20 ENCOUNTER — VIRTUAL VISIT (OUTPATIENT)
Dept: CONSULT | Facility: CLINIC | Age: 40
End: 2025-08-20
Attending: PEDIATRICS
Payer: COMMERCIAL

## 2025-08-20 DIAGNOSIS — E71.40 CARNITINE DEFICIENCY: Primary | ICD-10-CM

## 2025-08-20 DIAGNOSIS — Z15.89: ICD-10-CM

## 2025-08-20 RX ORDER — TRAZODONE HYDROCHLORIDE 50 MG/1
TABLET ORAL PRN
COMMUNITY
Start: 2025-01-28

## 2025-08-20 RX ORDER — LUMATEPERONE 42 MG/1
42 CAPSULE ORAL DAILY
COMMUNITY